# Patient Record
Sex: FEMALE | Race: WHITE | NOT HISPANIC OR LATINO | Employment: UNEMPLOYED | URBAN - METROPOLITAN AREA
[De-identification: names, ages, dates, MRNs, and addresses within clinical notes are randomized per-mention and may not be internally consistent; named-entity substitution may affect disease eponyms.]

---

## 2017-01-04 ENCOUNTER — HOSPITAL ENCOUNTER (OUTPATIENT)
Dept: MAMMOGRAPHY | Facility: HOSPITAL | Age: 57
Discharge: HOME | End: 2017-01-04
Attending: OBSTETRICS & GYNECOLOGY | Admitting: OBSTETRICS & GYNECOLOGY

## 2017-01-04 DIAGNOSIS — Z13.9 SCREENING: ICD-10-CM

## 2017-01-04 PROCEDURE — 77063 BREAST TOMOSYNTHESIS BI: CPT

## 2017-01-04 PROCEDURE — G0202 SCR MAMMO BI INCL CAD: HCPCS

## 2018-01-19 ENCOUNTER — TRANSCRIBE ORDERS (OUTPATIENT)
Dept: ADMINISTRATIVE | Facility: HOSPITAL | Age: 58
End: 2018-01-19

## 2018-01-19 DIAGNOSIS — Z12.31 VISIT FOR SCREENING MAMMOGRAM: Primary | ICD-10-CM

## 2018-01-23 ENCOUNTER — HOSPITAL ENCOUNTER (OUTPATIENT)
Dept: MAMMOGRAPHY | Facility: HOSPITAL | Age: 58
Discharge: HOME | End: 2018-01-23
Attending: OBSTETRICS & GYNECOLOGY | Admitting: OBSTETRICS & GYNECOLOGY

## 2018-01-23 DIAGNOSIS — Z12.31 VISIT FOR SCREENING MAMMOGRAM: ICD-10-CM

## 2018-01-23 PROCEDURE — 77067 SCR MAMMO BI INCL CAD: CPT

## 2018-01-23 PROCEDURE — 77063 BREAST TOMOSYNTHESIS BI: CPT

## 2018-01-25 ENCOUNTER — OFFICE VISIT (OUTPATIENT)
Dept: OBSTETRICS AND GYNECOLOGY | Facility: CLINIC | Age: 58
End: 2018-01-25

## 2018-01-25 VITALS
HEIGHT: 63 IN | DIASTOLIC BLOOD PRESSURE: 83 MMHG | WEIGHT: 158.9 LBS | BODY MASS INDEX: 28.16 KG/M2 | SYSTOLIC BLOOD PRESSURE: 124 MMHG

## 2018-01-25 DIAGNOSIS — Z13.9 SCREENING FOR CONDITION: Primary | ICD-10-CM

## 2018-01-25 DIAGNOSIS — Z01.419 ENCOUNTER FOR GYNECOLOGICAL EXAMINATION WITHOUT ABNORMAL FINDING: ICD-10-CM

## 2018-01-25 DIAGNOSIS — N95.1 HOT FLASHES DUE TO MENOPAUSE: ICD-10-CM

## 2018-01-25 PROBLEM — E03.9 HYPOTHYROIDISM: Status: ACTIVE | Noted: 2018-01-25

## 2018-01-25 LAB
BILIRUB BLD-MCNC: NEGATIVE MG/DL
CLARITY, POC: CLEAR
COLOR UR: YELLOW
GLUCOSE UR STRIP-MCNC: NEGATIVE MG/DL
KETONES UR QL: NEGATIVE
LEUKOCYTE EST, POC: NEGATIVE
NITRITE UR-MCNC: NEGATIVE MG/ML
PH UR: 5 [PH] (ref 5–8)
PROT UR STRIP-MCNC: NEGATIVE MG/DL
RBC # UR STRIP: NEGATIVE /UL
SP GR UR: 1 (ref 1–1.03)
UROBILINOGEN UR QL: NORMAL

## 2018-01-25 PROCEDURE — 99396 PREV VISIT EST AGE 40-64: CPT | Performed by: OBSTETRICS & GYNECOLOGY

## 2018-01-25 PROCEDURE — 81002 URINALYSIS NONAUTO W/O SCOPE: CPT | Performed by: OBSTETRICS & GYNECOLOGY

## 2018-01-25 RX ORDER — ATORVASTATIN CALCIUM 20 MG/1
TABLET, FILM COATED ORAL
Refills: 0 | COMMUNITY
Start: 2018-01-02 | End: 2018-04-04

## 2018-01-25 RX ORDER — MELOXICAM 7.5 MG/1
TABLET ORAL
Refills: 0 | COMMUNITY
Start: 2018-01-13 | End: 2018-04-04

## 2018-01-25 RX ORDER — LEVOTHYROXINE SODIUM 0.03 MG/1
25 TABLET ORAL
COMMUNITY
End: 2018-04-04

## 2018-01-25 RX ORDER — ERGOCALCIFEROL 1.25 MG/1
50000 CAPSULE ORAL
Refills: 0 | COMMUNITY
Start: 2017-12-27

## 2018-01-25 RX ORDER — PREDNISONE 5 MG/1
TABLET ORAL
Refills: 0 | COMMUNITY
Start: 2018-01-22 | End: 2018-04-04

## 2018-01-25 NOTE — PROGRESS NOTES
GYN Annual Exam     CC- Here for annual exam.     Cate Rosario is a 57 y.o. female established patient who presents for annual well woman exam. No  VB. She thought Abbey was working but stopped it b/c she did not want to take any meds. Her HF are still there but are mild and do not require treatment. No  VB.       OB History      Para Term  AB Living    1 1 1   1    SAB TAB Ectopic Multiple Live Births                Obstetric Comments    1 CS          Menarche:15  Menopause:49, ablation  HRT: took HRT for 2 years, none now  Current contraception: tubal ligation  History of abnormal Pap smear: no  History of abnormal mammogram: no  Family history of uterine, colon or ovarian cancer: yes - GM  Family history of breast cancer: no  STD's: none    Health Maintenance   Topic Date Due   • TDAP/TD VACCINES (1 - Tdap) 10/31/1981   • INFLUENZA VACCINE  2017   • HEPATITIS C SCREENING  2018   • PAP SMEAR  2018   • LIPID PANEL  2018   • MAMMOGRAM  2020   • COLONOSCOPY  2026       Past Medical History:   Diagnosis Date   • Heart murmur    • Heart murmur    • Hyperlipidemia    • Hypothyroidism 2018   • Sciatic nerve pain    • Thyroid disease        Past Surgical History:   Procedure Laterality Date   •  SECTION     • COLONOSCOPY N/A 2016    Procedure: COLONOSCOPY;  Surgeon: Juliane Chu MD;  Location: [facility];  Service:    • DILATATION AND CURETTAGE     • ENDOMETRIAL ABLATION     • LASER ABLATION     • TUBAL ABDOMINAL LIGATION           Current Outpatient Prescriptions:   •  aspirin 81 MG tablet, Take 81 mg by mouth daily., Disp: , Rfl:   •  atorvastatin (LIPITOR) 20 MG tablet, Take 1 tablet by mouth daily, Disp: , Rfl: 0  •  Bioflavonoid Products (MICHAEL C PO), Take  by mouth., Disp: , Rfl:   •  cyclobenzaprine (FLEXERIL) 10 MG tablet, Take 1 tablet (10 mg total) by mouth 3 (three) times a day as needed for muscle spasms, Disp: 20 tablet,  "Rfl: 0  •  HYDROcodone-acetaminophen (NORCO) 5-325 MG per tablet, Take 1 tablet by mouth every 4 (four) hours as needed for moderate pain (4-6), Disp: 20 tablet, Rfl: 0  •  HYDROCODONE-ACETAMINOPHEN PO, Take  by mouth., Disp: , Rfl:   •  levothyroxine (SYNTHROID, LEVOTHROID) 25 MCG tablet, Take 25 mcg by mouth., Disp: , Rfl:   •  meloxicam (MOBIC) 7.5 MG tablet, take 1 tablet by mouth daily, Disp: , Rfl: 0  •  PARoxetine Mesylate 7.5 MG capsule, Take  by mouth., Disp: , Rfl:   •  PEG-KCl-NaCl-NaSulf-Na Asc-C (MOVIPREP) 100 G reconstituted solution, Take as directed, Disp: 1 each, Rfl: 0  •  PredniSONE 5 MG (21) tablet therapy pack dosepak, take as directed by mouth for 6 days, Disp: , Rfl: 0  •  vitamin D (ERGOCALCIFEROL) 02490 units capsule capsule, , Disp: , Rfl: 0    No Known Allergies    Social History   Substance Use Topics   • Smoking status: Never Smoker   • Smokeless tobacco: None   • Alcohol use Yes      Comment: occ/social       Family History   Problem Relation Age of Onset   • Thyroid disease Mother    • Heart disease Father    • Colon cancer Maternal Grandmother    • Breast cancer Neg Hx    • Ovarian cancer Neg Hx        Review of Systems   Constitutional: Negative for appetite change, fatigue, fever and unexpected weight change.   Respiratory: Negative for cough and shortness of breath.    Cardiovascular: Negative for chest pain and palpitations.   Gastrointestinal: Negative for abdominal distention, abdominal pain, constipation, diarrhea and nausea.   Endocrine: Positive for heat intolerance (mild).   Genitourinary: Negative for dyspareunia, dysuria, menstrual problem, pelvic pain and vaginal discharge.   Musculoskeletal: Positive for back pain (sciatica).   Skin: Negative for color change and rash.   Neurological: Negative for headaches.   Psychiatric/Behavioral: Negative for dysphoric mood. The patient is not nervous/anxious.        /83  Ht 160 cm (63\")  Wt 72.1 kg (158 lb 14.4 oz)  LMP  " (LMP Unknown)  Breastfeeding? No  BMI 28.15 kg/m2    Physical Exam   Constitutional: She is oriented to person, place, and time. She appears well-developed and well-nourished.   HENT:   Head: Normocephalic and atraumatic.   Neck: No thyromegaly present.   Cardiovascular: Normal rate and regular rhythm.    Pulmonary/Chest: Effort normal and breath sounds normal. Right breast exhibits no inverted nipple, no mass, no nipple discharge, no skin change and no tenderness. Left breast exhibits no inverted nipple, no mass, no nipple discharge, no skin change and no tenderness.   Abdominal: Soft. Bowel sounds are normal. She exhibits no distension and no mass. There is no tenderness. No hernia.   Genitourinary: Uterus normal. Pelvic exam was performed with patient supine. There is no rash, tenderness or lesion on the right labia. There is no rash, tenderness or lesion on the left labia. Cervix exhibits no motion tenderness, no discharge and no friability. Right adnexum displays no mass, no tenderness and no fullness. Left adnexum displays no mass, no tenderness and no fullness. No erythema, tenderness or bleeding in the vagina. No foreign body in the vagina. No signs of injury around the vagina. No vaginal discharge found.   Neurological: She is oriented to person, place, and time.   Skin: Skin is warm and dry.   Psychiatric: She has a normal mood and affect. Her behavior is normal. Judgment and thought content normal.   Vitals reviewed.         Assessment/Plan    1) GYN HM: normal pap/HPV 1/2017    SBE demonstrated and encouraged.  2) STD screening: declines Condoms encouraged.  3) Bone health - Weight bearing exercise, dietary calcium recommendations and vitamin D reviewed.   4) Diet and Exercise discussed  5) Smoking Status: non smoker  6) Social: no issues  7)MMG:  UTD 1/2018 Birads 1  8) DEXA-needs, schedule  9)C scope- UTD fall 2016, repeat 5 years   Follow up prn and 1 year       Cate was seen today for gynecologic  exam.    Diagnoses and all orders for this visit:    Screening for condition  -     POC Urinalysis Dipstick  -     DEXA Bone Density Axial; Future    Encounter for gynecological examination without abnormal finding    Hot flashes due to menopause        Bety Irizarry MD  1/25/2018  10:09 AM

## 2018-02-08 ENCOUNTER — APPOINTMENT (OUTPATIENT)
Dept: BONE DENSITY | Facility: HOSPITAL | Age: 58
End: 2018-02-08
Attending: OBSTETRICS & GYNECOLOGY

## 2018-03-13 ENCOUNTER — OFFICE VISIT (OUTPATIENT)
Dept: ORTHOPEDIC SURGERY | Facility: CLINIC | Age: 58
End: 2018-03-13

## 2018-03-13 VITALS — WEIGHT: 158 LBS | BODY MASS INDEX: 26.98 KG/M2 | HEIGHT: 64 IN | TEMPERATURE: 98.4 F

## 2018-03-13 DIAGNOSIS — M51.26 HERNIATED LUMBAR INTERVERTEBRAL DISC: Primary | ICD-10-CM

## 2018-03-13 PROCEDURE — 99204 OFFICE O/P NEW MOD 45 MIN: CPT | Performed by: ORTHOPAEDIC SURGERY

## 2018-03-13 NOTE — PROGRESS NOTES
New patient or new problem visit    Chief Complaint   Patient presents with   • Lumbar Spine - Pain       HPI: She complains of left lower extremity pain more so than back pain ongoing since the first week of January of this year epidural injections ×2 of helped immensely but still with a lot of pain she's very uncomfortable and prefers to stand.  The pain is mild at present stabbing worse with activity.  PT did not help.    PFSH: See chart- reviewed    Review of Systems   Constitutional: Positive for diaphoresis. Negative for chills (night sweats), fever and unexpected weight change.   HENT: Negative for trouble swallowing and voice change.    Eyes: Negative for visual disturbance.   Respiratory: Negative for cough and shortness of breath.    Cardiovascular: Negative for chest pain and leg swelling.   Gastrointestinal: Negative for abdominal pain, nausea and vomiting.   Endocrine: Negative for cold intolerance and heat intolerance.   Genitourinary: Negative for difficulty urinating, frequency and urgency.   Skin: Negative for rash and wound.   Allergic/Immunologic: Negative for immunocompromised state.   Neurological: Negative for weakness and numbness.   Hematological: Does not bruise/bleed easily.   Psychiatric/Behavioral: Negative for dysphoric mood. The patient is not nervous/anxious.        PE: Constitutional: Vital signs above-noted.  Awake, alert and oriented    Psychiatric: Affect and insight do not appear grossly disturbed.    Pulmonary: Breathing is unlabored, color is good.    Skin: Warm, dry and normal turgor    Cardiac:  pedal pulses intact.  No edema.    Eyesight and hearing appear adequate for examination purposes      Musculoskeletal:  There is minimal tenderness to percussion and palpation of the spine. Motion appears undisturbed.  Posture is unremarkable to coronal and sagittal inspection.    The skin about the area is intact.  There is no palpable or visible deformity.  There is no local spasm.        Neurologic:   Reflexes are 2+ and symmetrical in the patellae and achilles.   Motor function is undisturbed in quadriceps, EHL, and gastrocnemius      Sensation appears symmetrically intact to light touch   .  In the bilateral lower extremities there is problems.  If we need to then I will be fine but not without exhausting conservative care I'll see her back in 3 weeks after she's had the last epidural injection.   Clonus is absent..  Gait appears undisturbed.  SLR test negative      MEDICAL DECISION MAKING    XRAY: Plain film x-rays of the lumbar spine show L5-S1 disc degeneration and some loss of lordosis.  MRI scan from [facility] open MRI shows a left-sided L2-3 disc extrusion and a left-sided L5 L5-S1 disc protrusion.  I agree with radiologist report    Other: n/a    Impression: Left L2-3, left L5-S1 herniated disc    Plan: 6

## 2018-03-26 ENCOUNTER — TELEPHONE (OUTPATIENT)
Dept: ORTHOPEDIC SURGERY | Facility: CLINIC | Age: 58
End: 2018-03-26

## 2018-03-26 NOTE — TELEPHONE ENCOUNTER
Her 3rd LESI is on Thursday, but she would like to go ahead and set up surgery. Should she still get the LESI or just cancel it? Please advise. I will need a case request please.         RA

## 2018-03-27 ENCOUNTER — PREP FOR SURGERY (OUTPATIENT)
Dept: OTHER | Facility: HOSPITAL | Age: 58
End: 2018-03-27

## 2018-03-27 DIAGNOSIS — M51.26 LUMBAR HERNIATED DISC: Primary | ICD-10-CM

## 2018-03-27 RX ORDER — SODIUM CHLORIDE, SODIUM LACTATE, POTASSIUM CHLORIDE, CALCIUM CHLORIDE 600; 310; 30; 20 MG/100ML; MG/100ML; MG/100ML; MG/100ML
100 INJECTION, SOLUTION INTRAVENOUS CONTINUOUS
Status: CANCELLED | OUTPATIENT
Start: 2018-04-09

## 2018-03-27 RX ORDER — CEFAZOLIN SODIUM 2 G/100ML
2 INJECTION, SOLUTION INTRAVENOUS ONCE
Status: CANCELLED | OUTPATIENT
Start: 2018-04-09 | End: 2018-04-09

## 2018-03-27 RX ORDER — SODIUM CHLORIDE 0.9 % (FLUSH) 0.9 %
1-10 SYRINGE (ML) INJECTION AS NEEDED
Status: CANCELLED | OUTPATIENT
Start: 2018-04-09

## 2018-03-29 PROBLEM — M51.26 LUMBAR HERNIATED DISC: Status: ACTIVE | Noted: 2018-03-29

## 2018-04-04 ENCOUNTER — APPOINTMENT (OUTPATIENT)
Dept: PREADMISSION TESTING | Facility: HOSPITAL | Age: 58
End: 2018-04-04

## 2018-04-04 VITALS
HEIGHT: 63 IN | RESPIRATION RATE: 20 BRPM | WEIGHT: 158 LBS | DIASTOLIC BLOOD PRESSURE: 85 MMHG | BODY MASS INDEX: 28 KG/M2 | SYSTOLIC BLOOD PRESSURE: 133 MMHG | TEMPERATURE: 98.3 F | HEART RATE: 77 BPM | OXYGEN SATURATION: 98 %

## 2018-04-04 LAB
ANION GAP SERPL CALCULATED.3IONS-SCNC: 15.2 MMOL/L
BUN BLD-MCNC: 10 MG/DL (ref 6–20)
BUN/CREAT SERPL: 14.3 (ref 7–25)
CALCIUM SPEC-SCNC: 10.2 MG/DL (ref 8.6–10.5)
CHLORIDE SERPL-SCNC: 103 MMOL/L (ref 98–107)
CO2 SERPL-SCNC: 26.8 MMOL/L (ref 22–29)
CREAT BLD-MCNC: 0.7 MG/DL (ref 0.57–1)
DEPRECATED RDW RBC AUTO: 47.2 FL (ref 37–54)
ERYTHROCYTE [DISTWIDTH] IN BLOOD BY AUTOMATED COUNT: 13.9 % (ref 11.7–13)
GFR SERPL CREATININE-BSD FRML MDRD: 87 ML/MIN/1.73
GLUCOSE BLD-MCNC: 95 MG/DL (ref 65–99)
HCT VFR BLD AUTO: 43.2 % (ref 35.6–45.5)
HGB BLD-MCNC: 13.4 G/DL (ref 11.9–15.5)
MCH RBC QN AUTO: 28.5 PG (ref 26.9–32)
MCHC RBC AUTO-ENTMCNC: 31 G/DL (ref 32.4–36.3)
MCV RBC AUTO: 91.9 FL (ref 80.5–98.2)
PLATELET # BLD AUTO: 178 10*3/MM3 (ref 140–500)
PMV BLD AUTO: 10.4 FL (ref 6–12)
POTASSIUM BLD-SCNC: 4.4 MMOL/L (ref 3.5–5.2)
RBC # BLD AUTO: 4.7 10*6/MM3 (ref 3.9–5.2)
SODIUM BLD-SCNC: 145 MMOL/L (ref 136–145)
WBC NRBC COR # BLD: 4.98 10*3/MM3 (ref 4.5–10.7)

## 2018-04-04 PROCEDURE — 36415 COLL VENOUS BLD VENIPUNCTURE: CPT

## 2018-04-04 PROCEDURE — 85027 COMPLETE CBC AUTOMATED: CPT | Performed by: ORTHOPAEDIC SURGERY

## 2018-04-04 PROCEDURE — 80048 BASIC METABOLIC PNL TOTAL CA: CPT | Performed by: ORTHOPAEDIC SURGERY

## 2018-04-04 RX ORDER — ATORVASTATIN CALCIUM 20 MG/1
20 TABLET, FILM COATED ORAL EVERY MORNING
COMMUNITY

## 2018-04-04 RX ORDER — LEVOTHYROXINE SODIUM 0.05 MG/1
50 TABLET ORAL EVERY MORNING
COMMUNITY
End: 2020-01-06

## 2018-04-04 NOTE — DISCHARGE INSTRUCTIONS
Take the following medications the morning of surgery with a small sip of water:none      General Instructions:  • Do not eat solid food after midnight the night before surgery.  • You may drink clear liquids day of surgery but must stop at least one hour before your hospital arrival time.  • It is beneficial for you to have a clear drink that contains carbohydrates the day of surgery.  We suggest a 12 to 20 ounce bottle of Gatorade or Powerade for non-diabetic patients or a 12 to 20 ounce bottle of G2 or Powerade Zero for diabetic patients. (Pediatric patients, are not advised to drink a 12 to 20 ounce carbohydrate drink)    Clear liquids are liquids you can see through.  Nothing red in color.     Plain water                               Sports drinks  Sodas                                   Gelatin (Jell-O)  Fruit juices without pulp such as white grape juice and apple juice  Popsicles that contain no fruit or yogurt  Tea or coffee (no cream or milk added)  Gatorade / Powerade  G2 / Powerade Zero    • Infants may have breast milk up to four hours before surgery.  • Infants drinking formula may drink formula up to six hours before surgery.   • Patients who avoid smoking, chewing tobacco and alcohol for 4 weeks prior to surgery have a reduced risk of post-operative complications.  Quit smoking as many days before surgery as you can.  • Do not smoke, use chewing tobacco or drink alcohol the day of surgery.   • If applicable bring your C-PAP/ BI-PAP machine.  • Bring any papers given to you in the doctor’s office.  • Wear clean comfortable clothes and socks.  • Do not wear contact lenses or make-up.  Bring a case for your glasses.   • Bring crutches or walker if applicable.  • Remove all piercings.  Leave jewelry and any other valuables at home.  • Hair extensions with metal clips must be removed prior to surgery.  • The Pre-Admission Testing nurse will instruct you to bring medications if unable to obtain an  accurate list in Pre-Admission Testing.        If you were given a blood bank ID arm band remember to bring it with you the day of surgery.    Preventing a Surgical Site Infection:  • For 2 to 3 days before surgery, avoid shaving with a razor because the razor can irritate skin and make it easier to develop an infection.  • The night prior to surgery sleep in a clean bed with clean clothing.  Do not allow pets to sleep with you.  • Shower on the morning of surgery using a fresh bar of anti-bacterial soap (such as Dial) and clean washcloth.  Dry with a clean towel and dress in clean clothing.  • Ask your surgeon if you will be receiving antibiotics prior to surgery.  • Make sure you, your family, and all healthcare providers clean their hands with soap and water or an alcohol based hand  before caring for you or your wound.    Day of surgery:  Upon arrival, a Pre-op nurse and Anesthesiologist will review your health history, obtain vital signs, and answer questions you may have.  The only belongings needed at this time will be your home medications and if applicable your C-PAP/BI-PAP machine.  If you are staying overnight your family can leave the rest of your belongings in the car and bring them to your room later.  A Pre-op nurse will start an IV and you may receive medication in preparation for surgery, including something to help you relax.  Your family will be able to see you in the Pre-op area.  While you are in surgery your family should notify the waiting room  if they leave the waiting room area and provide a contact phone number.    Please be aware that surgery does come with discomfort.  We want to make every effort to control your discomfort so please discuss any uncontrolled symptoms with your nurse.   Your doctor will most likely have prescribed pain medications.      If you are going home after surgery you will receive individualized written care instructions before being  discharged.  A responsible adult must drive you to and from the hospital on the day of your surgery and stay with you for 24 hours.    If you are staying overnight following surgery, you will be transported to your hospital room following the recovery period.  [facility] has all private rooms.    If you have any questions please call Pre-Admission Testing at [phone].  Deductibles and co-payments are collected on the day of service. Please be prepared to pay the required co-pay, deductible or deposit on the day of service as defined by your plan.

## 2018-04-09 ENCOUNTER — ANESTHESIA (OUTPATIENT)
Dept: PERIOP | Facility: HOSPITAL | Age: 58
End: 2018-04-09

## 2018-04-09 ENCOUNTER — APPOINTMENT (OUTPATIENT)
Dept: GENERAL RADIOLOGY | Facility: HOSPITAL | Age: 58
End: 2018-04-09

## 2018-04-09 ENCOUNTER — ANESTHESIA EVENT (OUTPATIENT)
Dept: PERIOP | Facility: HOSPITAL | Age: 58
End: 2018-04-09

## 2018-04-09 ENCOUNTER — HOSPITAL ENCOUNTER (OUTPATIENT)
Facility: HOSPITAL | Age: 58
Discharge: HOME | End: 2018-04-10
Attending: ORTHOPAEDIC SURGERY | Admitting: ORTHOPAEDIC SURGERY

## 2018-04-09 DIAGNOSIS — M51.26 LUMBAR HERNIATED DISC: ICD-10-CM

## 2018-04-09 LAB
ABO GROUP BLD: NORMAL
BLD GP AB SCN SERPL QL: NEGATIVE
RH BLD: NEGATIVE
T&S EXPIRATION DATE: NORMAL

## 2018-04-09 PROCEDURE — 25010000002 ONDANSETRON PER 1 MG: Performed by: NURSE ANESTHETIST, CERTIFIED REGISTERED

## 2018-04-09 PROCEDURE — 25010000002 FENTANYL CITRATE (PF) 100 MCG/2ML SOLUTION: Performed by: ANESTHESIOLOGY

## 2018-04-09 PROCEDURE — 25010000002 FENTANYL CITRATE (PF) 100 MCG/2ML SOLUTION: Performed by: NURSE ANESTHETIST, CERTIFIED REGISTERED

## 2018-04-09 PROCEDURE — 63035 LAMOT DCMPRN NRV RT EA ADDL: CPT | Performed by: ORTHOPAEDIC SURGERY

## 2018-04-09 PROCEDURE — 86901 BLOOD TYPING SEROLOGIC RH(D): CPT | Performed by: ORTHOPAEDIC SURGERY

## 2018-04-09 PROCEDURE — 86900 BLOOD TYPING SEROLOGIC ABO: CPT | Performed by: ORTHOPAEDIC SURGERY

## 2018-04-09 PROCEDURE — 25010000002 HYDROMORPHONE HCL PF 500 MG/50ML SOLUTION: Performed by: ORTHOPAEDIC SURGERY

## 2018-04-09 PROCEDURE — G0378 HOSPITAL OBSERVATION PER HR: HCPCS

## 2018-04-09 PROCEDURE — 25010000002 DEXAMETHASONE PER 1 MG: Performed by: NURSE ANESTHETIST, CERTIFIED REGISTERED

## 2018-04-09 PROCEDURE — 25010000002 HYDROMORPHONE PER 4 MG: Performed by: NURSE ANESTHETIST, CERTIFIED REGISTERED

## 2018-04-09 PROCEDURE — 63030 LAMOT DCMPRN NRV RT 1 LMBR: CPT | Performed by: ORTHOPAEDIC SURGERY

## 2018-04-09 PROCEDURE — 25810000003 POTASSIUM CHLORIDE PER 2 MEQ: Performed by: ORTHOPAEDIC SURGERY

## 2018-04-09 PROCEDURE — 25010000003 CEFAZOLIN IN DEXTROSE 2-4 GM/100ML-% SOLUTION: Performed by: ORTHOPAEDIC SURGERY

## 2018-04-09 PROCEDURE — 86850 RBC ANTIBODY SCREEN: CPT | Performed by: ORTHOPAEDIC SURGERY

## 2018-04-09 PROCEDURE — 76000 FLUOROSCOPY <1 HR PHYS/QHP: CPT

## 2018-04-09 PROCEDURE — 25010000002 PROPOFOL 10 MG/ML EMULSION: Performed by: NURSE ANESTHETIST, CERTIFIED REGISTERED

## 2018-04-09 PROCEDURE — 72100 X-RAY EXAM L-S SPINE 2/3 VWS: CPT

## 2018-04-09 PROCEDURE — 25010000002 MIDAZOLAM PER 1 MG: Performed by: ANESTHESIOLOGY

## 2018-04-09 RX ORDER — FENTANYL CITRATE 50 UG/ML
INJECTION, SOLUTION INTRAMUSCULAR; INTRAVENOUS AS NEEDED
Status: DISCONTINUED | OUTPATIENT
Start: 2018-04-09 | End: 2018-04-09 | Stop reason: SURG

## 2018-04-09 RX ORDER — ROCURONIUM BROMIDE 10 MG/ML
INJECTION, SOLUTION INTRAVENOUS AS NEEDED
Status: DISCONTINUED | OUTPATIENT
Start: 2018-04-09 | End: 2018-04-09 | Stop reason: SURG

## 2018-04-09 RX ORDER — ONDANSETRON 4 MG/1
4 TABLET, FILM COATED ORAL EVERY 6 HOURS PRN
Status: DISCONTINUED | OUTPATIENT
Start: 2018-04-09 | End: 2018-04-10 | Stop reason: HOSPADM

## 2018-04-09 RX ORDER — ONDANSETRON 2 MG/ML
4 INJECTION INTRAMUSCULAR; INTRAVENOUS ONCE AS NEEDED
Status: DISCONTINUED | OUTPATIENT
Start: 2018-04-09 | End: 2018-04-09 | Stop reason: HOSPADM

## 2018-04-09 RX ORDER — SODIUM CHLORIDE, SODIUM LACTATE, POTASSIUM CHLORIDE, CALCIUM CHLORIDE 600; 310; 30; 20 MG/100ML; MG/100ML; MG/100ML; MG/100ML
100 INJECTION, SOLUTION INTRAVENOUS CONTINUOUS
Status: DISCONTINUED | OUTPATIENT
Start: 2018-04-09 | End: 2018-04-10 | Stop reason: HOSPADM

## 2018-04-09 RX ORDER — NALOXONE HCL 0.4 MG/ML
0.2 VIAL (ML) INJECTION AS NEEDED
Status: DISCONTINUED | OUTPATIENT
Start: 2018-04-09 | End: 2018-04-09 | Stop reason: HOSPADM

## 2018-04-09 RX ORDER — BISACODYL 10 MG
10 SUPPOSITORY, RECTAL RECTAL DAILY PRN
Status: DISCONTINUED | OUTPATIENT
Start: 2018-04-09 | End: 2018-04-10 | Stop reason: HOSPADM

## 2018-04-09 RX ORDER — FLUMAZENIL 0.1 MG/ML
0.2 INJECTION INTRAVENOUS AS NEEDED
Status: DISCONTINUED | OUTPATIENT
Start: 2018-04-09 | End: 2018-04-09 | Stop reason: HOSPADM

## 2018-04-09 RX ORDER — FAMOTIDINE 10 MG/ML
20 INJECTION, SOLUTION INTRAVENOUS ONCE
Status: COMPLETED | OUTPATIENT
Start: 2018-04-09 | End: 2018-04-09

## 2018-04-09 RX ORDER — LIDOCAINE HYDROCHLORIDE 10 MG/ML
0.5 INJECTION, SOLUTION EPIDURAL; INFILTRATION; INTRACAUDAL; PERINEURAL ONCE AS NEEDED
Status: DISCONTINUED | OUTPATIENT
Start: 2018-04-09 | End: 2018-04-09 | Stop reason: HOSPADM

## 2018-04-09 RX ORDER — FENTANYL CITRATE 50 UG/ML
50 INJECTION, SOLUTION INTRAMUSCULAR; INTRAVENOUS
Status: DISCONTINUED | OUTPATIENT
Start: 2018-04-09 | End: 2018-04-09 | Stop reason: HOSPADM

## 2018-04-09 RX ORDER — LABETALOL HYDROCHLORIDE 5 MG/ML
5 INJECTION, SOLUTION INTRAVENOUS
Status: DISCONTINUED | OUTPATIENT
Start: 2018-04-09 | End: 2018-04-09 | Stop reason: HOSPADM

## 2018-04-09 RX ORDER — CEFAZOLIN SODIUM 2 G/100ML
2 INJECTION, SOLUTION INTRAVENOUS EVERY 8 HOURS
Status: COMPLETED | OUTPATIENT
Start: 2018-04-09 | End: 2018-04-10

## 2018-04-09 RX ORDER — ATORVASTATIN CALCIUM 20 MG/1
20 TABLET, FILM COATED ORAL EVERY MORNING
Status: DISCONTINUED | OUTPATIENT
Start: 2018-04-10 | End: 2018-04-10 | Stop reason: HOSPADM

## 2018-04-09 RX ORDER — SODIUM CHLORIDE AND POTASSIUM CHLORIDE 150; 450 MG/100ML; MG/100ML
100 INJECTION, SOLUTION INTRAVENOUS CONTINUOUS
Status: DISCONTINUED | OUTPATIENT
Start: 2018-04-09 | End: 2018-04-10 | Stop reason: HOSPADM

## 2018-04-09 RX ORDER — PROMETHAZINE HYDROCHLORIDE 25 MG/1
12.5 TABLET ORAL ONCE AS NEEDED
Status: DISCONTINUED | OUTPATIENT
Start: 2018-04-09 | End: 2018-04-09 | Stop reason: HOSPADM

## 2018-04-09 RX ORDER — PROPOFOL 10 MG/ML
VIAL (ML) INTRAVENOUS AS NEEDED
Status: DISCONTINUED | OUTPATIENT
Start: 2018-04-09 | End: 2018-04-09 | Stop reason: SURG

## 2018-04-09 RX ORDER — ONDANSETRON 4 MG/1
4 TABLET, ORALLY DISINTEGRATING ORAL EVERY 6 HOURS PRN
Status: DISCONTINUED | OUTPATIENT
Start: 2018-04-09 | End: 2018-04-10 | Stop reason: HOSPADM

## 2018-04-09 RX ORDER — HYDROMORPHONE HCL IN 0.9% NACL 10 MG/50ML
PATIENT CONTROLLED ANALGESIA SYRINGE INTRAVENOUS CONTINUOUS
Status: DISCONTINUED | OUTPATIENT
Start: 2018-04-09 | End: 2018-04-10 | Stop reason: HOSPADM

## 2018-04-09 RX ORDER — OXYCODONE HYDROCHLORIDE AND ACETAMINOPHEN 5; 325 MG/1; MG/1
2 TABLET ORAL EVERY 4 HOURS PRN
Status: DISCONTINUED | OUTPATIENT
Start: 2018-04-09 | End: 2018-04-10 | Stop reason: HOSPADM

## 2018-04-09 RX ORDER — ONDANSETRON 2 MG/ML
4 INJECTION INTRAMUSCULAR; INTRAVENOUS EVERY 6 HOURS PRN
Status: DISCONTINUED | OUTPATIENT
Start: 2018-04-09 | End: 2018-04-10 | Stop reason: HOSPADM

## 2018-04-09 RX ORDER — MEPERIDINE HYDROCHLORIDE 25 MG/ML
12.5 INJECTION INTRAMUSCULAR; INTRAVENOUS; SUBCUTANEOUS
Status: DISCONTINUED | OUTPATIENT
Start: 2018-04-09 | End: 2018-04-09 | Stop reason: HOSPADM

## 2018-04-09 RX ORDER — SODIUM CHLORIDE 0.9 % (FLUSH) 0.9 %
1-10 SYRINGE (ML) INJECTION AS NEEDED
Status: DISCONTINUED | OUTPATIENT
Start: 2018-04-09 | End: 2018-04-09 | Stop reason: HOSPADM

## 2018-04-09 RX ORDER — NALOXONE HCL 0.4 MG/ML
0.1 VIAL (ML) INJECTION
Status: DISCONTINUED | OUTPATIENT
Start: 2018-04-09 | End: 2018-04-10 | Stop reason: HOSPADM

## 2018-04-09 RX ORDER — HYDRALAZINE HYDROCHLORIDE 20 MG/ML
5 INJECTION INTRAMUSCULAR; INTRAVENOUS
Status: DISCONTINUED | OUTPATIENT
Start: 2018-04-09 | End: 2018-04-09 | Stop reason: HOSPADM

## 2018-04-09 RX ORDER — ACETAMINOPHEN 325 MG/1
650 TABLET ORAL EVERY 4 HOURS PRN
Status: DISCONTINUED | OUTPATIENT
Start: 2018-04-09 | End: 2018-04-10 | Stop reason: HOSPADM

## 2018-04-09 RX ORDER — LEVOTHYROXINE SODIUM 0.05 MG/1
50 TABLET ORAL EVERY MORNING
Status: DISCONTINUED | OUTPATIENT
Start: 2018-04-10 | End: 2018-04-10 | Stop reason: HOSPADM

## 2018-04-09 RX ORDER — SODIUM CHLORIDE 0.9 % (FLUSH) 0.9 %
1-10 SYRINGE (ML) INJECTION AS NEEDED
Status: DISCONTINUED | OUTPATIENT
Start: 2018-04-09 | End: 2018-04-10 | Stop reason: HOSPADM

## 2018-04-09 RX ORDER — HYDROMORPHONE HCL 110MG/55ML
0.5 PATIENT CONTROLLED ANALGESIA SYRINGE INTRAVENOUS
Status: DISCONTINUED | OUTPATIENT
Start: 2018-04-09 | End: 2018-04-09 | Stop reason: HOSPADM

## 2018-04-09 RX ORDER — LIDOCAINE HYDROCHLORIDE 20 MG/ML
INJECTION, SOLUTION INFILTRATION; PERINEURAL AS NEEDED
Status: DISCONTINUED | OUTPATIENT
Start: 2018-04-09 | End: 2018-04-09 | Stop reason: SURG

## 2018-04-09 RX ORDER — MIDAZOLAM HYDROCHLORIDE 1 MG/ML
1 INJECTION INTRAMUSCULAR; INTRAVENOUS
Status: DISCONTINUED | OUTPATIENT
Start: 2018-04-09 | End: 2018-04-09 | Stop reason: HOSPADM

## 2018-04-09 RX ORDER — OXYCODONE AND ACETAMINOPHEN 7.5; 325 MG/1; MG/1
1 TABLET ORAL ONCE AS NEEDED
Status: DISCONTINUED | OUTPATIENT
Start: 2018-04-09 | End: 2018-04-09 | Stop reason: HOSPADM

## 2018-04-09 RX ORDER — PROMETHAZINE HYDROCHLORIDE 25 MG/ML
12.5 INJECTION, SOLUTION INTRAMUSCULAR; INTRAVENOUS ONCE AS NEEDED
Status: DISCONTINUED | OUTPATIENT
Start: 2018-04-09 | End: 2018-04-09 | Stop reason: HOSPADM

## 2018-04-09 RX ORDER — DIPHENHYDRAMINE HYDROCHLORIDE 50 MG/ML
12.5 INJECTION INTRAMUSCULAR; INTRAVENOUS
Status: DISCONTINUED | OUTPATIENT
Start: 2018-04-09 | End: 2018-04-09 | Stop reason: HOSPADM

## 2018-04-09 RX ORDER — DEXAMETHASONE SODIUM PHOSPHATE 4 MG/ML
INJECTION, SOLUTION INTRA-ARTICULAR; INTRALESIONAL; INTRAMUSCULAR; INTRAVENOUS; SOFT TISSUE AS NEEDED
Status: DISCONTINUED | OUTPATIENT
Start: 2018-04-09 | End: 2018-04-09 | Stop reason: SURG

## 2018-04-09 RX ORDER — CEFAZOLIN SODIUM 2 G/100ML
2 INJECTION, SOLUTION INTRAVENOUS ONCE
Status: COMPLETED | OUTPATIENT
Start: 2018-04-09 | End: 2018-04-09

## 2018-04-09 RX ORDER — PROMETHAZINE HYDROCHLORIDE 25 MG/1
25 SUPPOSITORY RECTAL ONCE AS NEEDED
Status: DISCONTINUED | OUTPATIENT
Start: 2018-04-09 | End: 2018-04-09 | Stop reason: HOSPADM

## 2018-04-09 RX ORDER — PROMETHAZINE HYDROCHLORIDE 25 MG/1
25 TABLET ORAL ONCE AS NEEDED
Status: DISCONTINUED | OUTPATIENT
Start: 2018-04-09 | End: 2018-04-09 | Stop reason: HOSPADM

## 2018-04-09 RX ORDER — MIDAZOLAM HYDROCHLORIDE 1 MG/ML
2 INJECTION INTRAMUSCULAR; INTRAVENOUS
Status: DISCONTINUED | OUTPATIENT
Start: 2018-04-09 | End: 2018-04-09 | Stop reason: HOSPADM

## 2018-04-09 RX ORDER — HYDROCODONE BITARTRATE AND ACETAMINOPHEN 7.5; 325 MG/1; MG/1
1 TABLET ORAL ONCE AS NEEDED
Status: DISCONTINUED | OUTPATIENT
Start: 2018-04-09 | End: 2018-04-09 | Stop reason: HOSPADM

## 2018-04-09 RX ORDER — ONDANSETRON 2 MG/ML
INJECTION INTRAMUSCULAR; INTRAVENOUS AS NEEDED
Status: DISCONTINUED | OUTPATIENT
Start: 2018-04-09 | End: 2018-04-09 | Stop reason: SURG

## 2018-04-09 RX ORDER — SENNA AND DOCUSATE SODIUM 50; 8.6 MG/1; MG/1
1 TABLET, FILM COATED ORAL NIGHTLY
Status: DISCONTINUED | OUTPATIENT
Start: 2018-04-09 | End: 2018-04-10 | Stop reason: HOSPADM

## 2018-04-09 RX ORDER — SODIUM CHLORIDE, SODIUM LACTATE, POTASSIUM CHLORIDE, CALCIUM CHLORIDE 600; 310; 30; 20 MG/100ML; MG/100ML; MG/100ML; MG/100ML
9 INJECTION, SOLUTION INTRAVENOUS CONTINUOUS
Status: DISCONTINUED | OUTPATIENT
Start: 2018-04-09 | End: 2018-04-10 | Stop reason: HOSPADM

## 2018-04-09 RX ORDER — EPHEDRINE SULFATE 50 MG/ML
5 INJECTION, SOLUTION INTRAVENOUS ONCE AS NEEDED
Status: DISCONTINUED | OUTPATIENT
Start: 2018-04-09 | End: 2018-04-09 | Stop reason: HOSPADM

## 2018-04-09 RX ADMIN — PROPOFOL 150 MG: 10 INJECTION, EMULSION INTRAVENOUS at 13:26

## 2018-04-09 RX ADMIN — FENTANYL CITRATE 50 MCG: 50 INJECTION, SOLUTION INTRAMUSCULAR; INTRAVENOUS at 08:32

## 2018-04-09 RX ADMIN — FENTANYL CITRATE 100 MCG: 50 INJECTION INTRAMUSCULAR; INTRAVENOUS at 13:22

## 2018-04-09 RX ADMIN — MIDAZOLAM 1 MG: 1 INJECTION INTRAMUSCULAR; INTRAVENOUS at 08:32

## 2018-04-09 RX ADMIN — MIDAZOLAM 1 MG: 1 INJECTION INTRAMUSCULAR; INTRAVENOUS at 11:39

## 2018-04-09 RX ADMIN — POTASSIUM CHLORIDE AND SODIUM CHLORIDE 100 ML/HR: 450; 150 INJECTION, SOLUTION INTRAVENOUS at 21:38

## 2018-04-09 RX ADMIN — DOCUSATE SODIUM -SENNOSIDES 1 TABLET: 50; 8.6 TABLET, COATED ORAL at 21:38

## 2018-04-09 RX ADMIN — CEFAZOLIN SODIUM 2 G: 2 INJECTION, SOLUTION INTRAVENOUS at 21:38

## 2018-04-09 RX ADMIN — FENTANYL CITRATE 50 MCG: 50 INJECTION, SOLUTION INTRAMUSCULAR; INTRAVENOUS at 10:12

## 2018-04-09 RX ADMIN — LIDOCAINE HYDROCHLORIDE 60 MG: 20 INJECTION, SOLUTION INFILTRATION; PERINEURAL at 13:26

## 2018-04-09 RX ADMIN — ROCURONIUM BROMIDE 40 MG: 10 INJECTION INTRAVENOUS at 13:26

## 2018-04-09 RX ADMIN — ROCURONIUM BROMIDE 10 MG: 10 INJECTION INTRAVENOUS at 14:45

## 2018-04-09 RX ADMIN — CEFAZOLIN SODIUM 2 G: 2 INJECTION, SOLUTION INTRAVENOUS at 13:30

## 2018-04-09 RX ADMIN — SODIUM CHLORIDE, POTASSIUM CHLORIDE, SODIUM LACTATE AND CALCIUM CHLORIDE 9 ML/HR: 600; 310; 30; 20 INJECTION, SOLUTION INTRAVENOUS at 10:12

## 2018-04-09 RX ADMIN — HYDROMORPHONE HYDROCHLORIDE 0.5 MG: 2 INJECTION, SOLUTION INTRAMUSCULAR; INTRAVENOUS; SUBCUTANEOUS at 16:32

## 2018-04-09 RX ADMIN — ONDANSETRON 4 MG: 2 INJECTION INTRAMUSCULAR; INTRAVENOUS at 15:06

## 2018-04-09 RX ADMIN — DEXAMETHASONE SODIUM PHOSPHATE 6 MG: 4 INJECTION INTRA-ARTICULAR; INTRALESIONAL; INTRAMUSCULAR; INTRAVENOUS; SOFT TISSUE at 14:30

## 2018-04-09 RX ADMIN — SUGAMMADEX 300 MG: 100 INJECTION, SOLUTION INTRAVENOUS at 15:15

## 2018-04-09 RX ADMIN — MIDAZOLAM 1 MG: 1 INJECTION INTRAMUSCULAR; INTRAVENOUS at 10:12

## 2018-04-09 RX ADMIN — FENTANYL CITRATE 50 MCG: 50 INJECTION INTRAMUSCULAR; INTRAVENOUS at 15:56

## 2018-04-09 RX ADMIN — SODIUM CHLORIDE, POTASSIUM CHLORIDE, SODIUM LACTATE AND CALCIUM CHLORIDE: 600; 310; 30; 20 INJECTION, SOLUTION INTRAVENOUS at 15:33

## 2018-04-09 RX ADMIN — FAMOTIDINE 20 MG: 10 INJECTION INTRAVENOUS at 08:32

## 2018-04-09 RX ADMIN — HYDROMORPHONE HYDROCHLORIDE 0.5 MG: 2 INJECTION, SOLUTION INTRAMUSCULAR; INTRAVENOUS; SUBCUTANEOUS at 16:49

## 2018-04-09 RX ADMIN — HYDROMORPHONE HYDROCHLORIDE: 10 INJECTION INTRAMUSCULAR; INTRAVENOUS; SUBCUTANEOUS at 16:04

## 2018-04-09 RX ADMIN — FENTANYL CITRATE 50 MCG: 50 INJECTION INTRAMUSCULAR; INTRAVENOUS at 16:25

## 2018-04-09 RX ADMIN — SODIUM CHLORIDE, POTASSIUM CHLORIDE, SODIUM LACTATE AND CALCIUM CHLORIDE 100 ML/HR: 600; 310; 30; 20 INJECTION, SOLUTION INTRAVENOUS at 08:32

## 2018-04-09 NOTE — OP NOTE
Operative note    Preoperative diagnosis:  Left L2-3, L5-S1 herniated discs    Postoperative diagnosis:  same    Procedure:  Left L2-3, L5-S1 discectomies    Surgeon:  Jr. MD Bren Padillat.:  Courtney Branch    Anesthetic: Gen.    EBL:  Minimal     Condition: Satisfactory    Description of procedure:  The patient was anesthetized and positioned prone the back was prepped and draped in sterile fashion.  Using C-arm identified the lumbosacral disc space.  A small laminotomy was performed on the left side at L5-S1.  The yellow ligament was excised and the shoulder of the S1 nerve root was retracted medially.  The disc protrusion was prominent and easily palpable in the thinned annulus.  This was incised and multiple fragments were removed with pituitary Ronjair is both straight and angled as well as irrigation of the disc space.  Finally I could easily retract the S1 root medially and no disc material remained loose at the herniation site.  I moved up to L2-3 and made a separate incision again using C-arm for guidance and performed an L2-3 discectomy and similar fashion on the left side.  Wounds were irrigated and closed with #1 Vicryl for the lumbodorsal fascia 2-0 Vicryl subcutaneously 4-0 Monocryl and Dermabond on the skin.  The patient tolerated procedure was about returned recovery room

## 2018-04-09 NOTE — PLAN OF CARE
Problem: Patient Care Overview  Goal: Plan of Care Review  Outcome: Ongoing (interventions implemented as appropriate)   04/09/18 1737   Plan of Care Review   Progress improving   OTHER   Outcome Summary Admit from PACU after lumbar surgery. Drsg clean, dry and intact. Ambulated to BR with assist x2, voided without difficulties.        Problem: Skin Injury Risk (Adult)  Goal: Identify Related Risk Factors and Signs and Symptoms  Outcome: Ongoing (interventions implemented as appropriate)    Goal: Skin Health and Integrity  Outcome: Ongoing (interventions implemented as appropriate)

## 2018-04-09 NOTE — ANESTHESIA PROCEDURE NOTES
Airway  Urgency: elective    Date/Time: 4/9/2018 1:28 PM  Airway not difficult    General Information and Staff    Patient location during procedure: OR  Anesthesiologist: MARGO OSBORNE  CRNA: ADINA LIMA    Indications and Patient Condition  Indications for airway management: airway protection    Preoxygenated: yes  Mask difficulty assessment: 2 - vent by mask + OA or adjuvant +/- NMBA    Final Airway Details  Final airway type: endotracheal airway      Successful airway: ETT  Cuffed: yes   Successful intubation technique: direct laryngoscopy  Facilitating devices/methods: intubating stylet  Endotracheal tube insertion site: oral  Blade: Gomez  Blade size: #2  ETT size: 7.0 mm  Cormack-Lehane Classification: grade I - full view of glottis  Placement verified by: chest auscultation and capnometry   Cuff volume (mL): 7  Measured from: lips  ETT to lips (cm): 20  Number of attempts at approach: 1    Additional Comments  EBBS: ETCO2+: Atraumatic intubation; Lips and teeth same as pre op

## 2018-04-09 NOTE — ANESTHESIA PREPROCEDURE EVALUATION
Anesthesia Evaluation     Patient summary reviewed and Nursing notes reviewed   no history of anesthetic complications:  NPO Solid Status: > 8 hours  NPO Liquid Status: > 8 hours           Airway   Mallampati: II  Dental - normal exam     Pulmonary - normal exam   Cardiovascular - normal exam    (+) valvular problems/murmurs murmur, hyperlipidemia,       Neuro/Psych  (+) numbness,     GI/Hepatic/Renal/Endo    (+)   hypothyroidism,     Musculoskeletal     Abdominal    Substance History      OB/GYN          Other                        Anesthesia Plan    ASA 2     general     intravenous induction   Anesthetic plan and risks discussed with patient.

## 2018-04-09 NOTE — ANESTHESIA POSTPROCEDURE EVALUATION
Patient: Cate Rosario    Procedure Summary     Date:  04/09/18 Room / Location:  [facility] OR [address] [place] [address] MAIN OR    Anesthesia Start:  1317 Anesthesia Stop:  1534    Procedure:  left L2-3, L5-S1 discectomies (Left Spine Lumbar) Diagnosis:       Lumbar herniated disc      (Lumbar herniated disc [M51.26])    Surgeon:  Rylan Castaneda MD Provider:  Ethel Ocampo MD    Anesthesia Type:  general ASA Status:  2          Anesthesia Type: general  Last vitals  BP   124/77 (04/09/18 1630)   Temp   36.6 °C (97.8 °F) (04/09/18 1531)   Pulse   71 (04/09/18 1630)   Resp   14 (04/09/18 1630)     SpO2   97 % (04/09/18 1630)     Post Anesthesia Care and Evaluation    Patient location during evaluation: PACU  Patient participation: complete - patient participated  Level of consciousness: awake and alert  Pain management: adequate  Airway patency: patent  Anesthetic complications: No anesthetic complications    Cardiovascular status: acceptable  Respiratory status: acceptable  Hydration status: acceptable

## 2018-04-10 VITALS
OXYGEN SATURATION: 96 % | HEART RATE: 82 BPM | WEIGHT: 157.19 LBS | TEMPERATURE: 98.9 F | HEIGHT: 63 IN | SYSTOLIC BLOOD PRESSURE: 112 MMHG | RESPIRATION RATE: 16 BRPM | BODY MASS INDEX: 27.85 KG/M2 | DIASTOLIC BLOOD PRESSURE: 63 MMHG

## 2018-04-10 PROCEDURE — 25010000003 CEFAZOLIN IN DEXTROSE 2-4 GM/100ML-% SOLUTION: Performed by: ORTHOPAEDIC SURGERY

## 2018-04-10 PROCEDURE — 99024 POSTOP FOLLOW-UP VISIT: CPT | Performed by: ORTHOPAEDIC SURGERY

## 2018-04-10 PROCEDURE — G0378 HOSPITAL OBSERVATION PER HR: HCPCS

## 2018-04-10 RX ORDER — OXYCODONE HYDROCHLORIDE AND ACETAMINOPHEN 5; 325 MG/1; MG/1
TABLET ORAL
Qty: 40 TABLET | Refills: 0 | Status: SHIPPED | OUTPATIENT
Start: 2018-04-10 | End: 2020-01-06

## 2018-04-10 RX ADMIN — LEVOTHYROXINE SODIUM 50 MCG: 50 TABLET ORAL at 07:07

## 2018-04-10 RX ADMIN — ATORVASTATIN CALCIUM 20 MG: 20 TABLET, FILM COATED ORAL at 07:07

## 2018-04-10 RX ADMIN — OXYCODONE HYDROCHLORIDE AND ACETAMINOPHEN 2 TABLET: 5; 325 TABLET ORAL at 10:03

## 2018-04-10 RX ADMIN — CEFAZOLIN SODIUM 2 G: 2 INJECTION, SOLUTION INTRAVENOUS at 05:40

## 2018-04-10 NOTE — DISCHARGE INSTRUCTIONS
EZIO CORONEL JR., M.D.   ORTHOPAEDIC SURGERY   [address] [address]       LUMBAR DISCECTOMY/LAMINECTOMY/DECOMPRESSION   HOME INSTRUCTIONS       1.     You will need to check your incision or have a family member to check your            incision EVERYDAY for the following signs:              Increase in redness            Increase in swelling around the incision or low back area            Increase in pain            Any drainage noted from the incision            Edges of the incision are pulling apart            Increase in overall body temperature (greater than 100.5 degrees)              Call your physician if any of these listed above occur after you are            discharged from the hospital.  DO NOT wait until your office visit to             inform the physician.     2.      Walking must be done on a daily basis.  You should walk at least twice a              day and more if you are able.  Start with short distances and gradually add            a little distance each day.     3.      There will be no formal physical therapy for 4 weeks unless you are having            trouble with transferring in and out of bed or problems with generalized            muscle weakness.     4.      You may be helpful to use an elevated toilet seat for six (6) weeks            following your surgery when you go to the bathroom.     5.      You may shower three (3) days after your surgery, as long as there is no         drainage.  The incision does not need to be covered.  You must keep the incision clean and dry after showering.     6.      You may want to sleep on a firm mattress.  Avoid waterbeds.           7.      We would prefer that you do not sit at all if possible.  You may sit for the            bathroom only if you use the toilet seat extender.  Sitting may be allowed            with meals occasionally, only if you use a barstool and sit at a high            kitchen counter,  otherwise, you will need to stand at the counter with            meals.     8.      Smoking should be avoided all together if possible.  If you are a smoker,            you should avoid smoking for 2-4 weeks from the time of your surgery.            Smoking may interfere with the wound healing.     9.      Avoid any lifting, pushing, or pulling of heavy objects after surgery.     10.    You may drive a car, usually 2 weeks after your surgery, but only            after you check with your physician.     11.    You may ride in a car for 30 minutes to an hour after the first 1-2 weeks              following your surgery.     12.    You may go home from the hospital in a car, but you must wear your            seat belt.     13.    You will be sent home with pain medication and it will only be used up            to 3-4 weeks after surgery.  Refills may be obtained, but ONLY during            office hours.     14.    Avoid any bending or twisting at the waist for six (6) weeks after surgery.     15.    You may go up and down stairs, but take it easy at first.  Avoid any            unnecessary trips up and down the stairs.     16.    Sexual activities should be avoided for two weeks after surgery.  After two            weeks, it is fine, but you should take it easy for at least six weeks.           17.    Return to the office in 2 weeks to see Dr. Castaneda.

## 2018-04-10 NOTE — PLAN OF CARE
Problem: Patient Care Overview  Goal: Plan of Care Review  Outcome: Ongoing (interventions implemented as appropriate)   04/09/18 1845 04/09/18 2130 04/10/18 0315   Plan of Care Review   Progress improving --  --    OTHER   Outcome Summary --  --  Pt. remains A&Ox4 throughout shift. Pain controlled well with only Dilaudid PCA. Up to bathroom with assist x1 during shift, voiding well. PT to see in am. Possible d/c in am.   Coping/Psychosocial   Plan of Care Reviewed With --  patient --      Goal: Individualization and Mutuality  Outcome: Ongoing (interventions implemented as appropriate)    Goal: Discharge Needs Assessment  Outcome: Ongoing (interventions implemented as appropriate)      Problem: Skin Injury Risk (Adult)  Goal: Identify Related Risk Factors and Signs and Symptoms  Outcome: Ongoing (interventions implemented as appropriate)    Goal: Skin Health and Integrity  Outcome: Ongoing (interventions implemented as appropriate)      Problem: Laminectomy/Foraminotomy/Discectomy (Adult)  Goal: Signs and Symptoms of Listed Potential Problems Will be Absent, Minimized or Managed (Laminectomy/Foraminotomy/Discectomy)  Outcome: Ongoing (interventions implemented as appropriate)

## 2018-04-10 NOTE — DISCHARGE SUMMARY
Orthopedic Discharge Summary      Patient: Cate Rosario  YOB: 1960  Medical Record Number: [id number]    Attending Physician: Rylan Castaneda MD  Consulting Physician(s):   Consults     No orders found for last 30 day(s).          Date of Admission: 2018  7:27 AM  Date of Discharge:4/10/2018    Discharge Diagnosis: left L2-3, L5-S1 discectomies,       Presenting Problem/History of Present Illness: Lumbar herniated disc [M51.26]  Lumbar herniated disc [M51.26]        Allergies: No Known Allergies    Discharge Medications   Cate Rosario   Home Medication Instructions YEN:[id number]    Printed on:04/10/18 0649   Medication Information                      aspirin 81 MG tablet  Take 81 mg by mouth Daily. Stopped 18             atorvastatin (LIPITOR) 20 MG tablet  Take 20 mg by mouth Every Morning.             levothyroxine (SYNTHROID, LEVOTHROID) 50 MCG tablet  Take 50 mcg by mouth Every Morning.             oxyCODONE-acetaminophen (PERCOCET) 5-325 MG per tablet  1-2 tablets by mouth every 4-6 hours when necessary pain             vitamin D (ERGOCALCIFEROL) 87336 units capsule capsule  50,000 Units Every 7 (Seven) Days.                   Past Medical History:   Diagnosis Date   • Arthritis     cervical   • Cancer     skin   • Heart murmur    • History of diverticulosis    • Hyperlipidemia    • Hypoglycemia     occ   • Hypothyroidism 2018   • Post-menopausal    • Sciatic nerve pain    • Thyroid disease         Past Surgical History:   Procedure Laterality Date   •  SECTION     • COLONOSCOPY N/A 2016    Procedure: COLONOSCOPY;  Surgeon: Juliane Chu MD;  Location: [facility];  Service:    • DILATATION AND CURETTAGE     • ENDOMETRIAL ABLATION     • LASER ABLATION     • LASIK     • TUBAL ABDOMINAL LIGATION          Social History     Occupational History   • Not on file.     Social History Main Topics   • Smoking status: Never Smoker   • Smokeless tobacco: Never  Used   • Alcohol use No      Comment: occ/social   • Drug use: No   • Sexual activity: Yes     Partners: Male     Birth control/ protection: Surgical      Comment: BTL    Social History     Social History Narrative   • No narrative on file        Family History   Problem Relation Age of Onset   • Thyroid disease Mother    • Heart disease Father    • Colon cancer Maternal Grandmother    • Breast cancer Neg Hx    • Ovarian cancer Neg Hx    • Malig Hyperthermia Neg Hx          Physical Exam: 57 y.o. female  General Appearance:    Alert, cooperative, in no acute distress                    Vitals:    04/09/18 1645 04/09/18 1700 04/09/18 2100 04/10/18 0510   BP:  122/69 109/66 112/63   BP Location:   Right arm Right arm   Patient Position:   Lying Lying   Pulse: 71 70 74 82   Resp: 16 16 16 16   Temp:   98.9 °F (37.2 °C) 98.9 °F (37.2 °C)   TempSrc:   Oral Oral   SpO2: 96% 98% 94% 96%   Weight:       Height:            DIAGNOSTIC TESTS:   Admission on 04/09/2018   Component Date Value Ref Range Status   • ABO Type 04/09/2018 O   Final   • RH type 04/09/2018 Negative   Final   • Antibody Screen 04/09/2018 Negative   Final   • T&S Expiration Date 04/09/2018 4/12/2018 11:59:59 PM   Final       No results found.    Hospital Course:  57 y.o. female admitted to [facility] to services of Rylan Castaneda MD with Lumbar herniated disc [M51.26]  Lumbar herniated disc [M51.26] on 4/9/2018 and underwent left L2-3, L5-S1 discectomies  Per Rylan Castaneda MD. Antibiotic and VTE prophylaxis were per [facility] protocols.  The patient was admitted to the floor where IV and/or oral pain medications were administered for postoperative pain.  At discharge the incisional pain was tolerable and preop neurologic function was intact.  The dressing was dry and the wound was clean.    Condition on Discharge:  Stable    Discharge Instructions: . Patient may weight bear as tolerated unless otherwise specified. Continue JIN hose daily (for two  weeks) and ice regularly. Patient also instructed on incentive spirometer during hospitalization and encouraged to continue to use at home regularly. Patient may shower on POD #3 if and when all wound drainage has stopped.  Where applicable, the brace should be worn when up and about.  It need not be worn to the bathroom and certainly not in the shower.  A detailed list of instructions specific to the operation was given to the patient at the time of discharge.    Follow up Instructions:  Follow up in the office with Dr. Ryaln Castaneda Jr. in 2-3 weeks - patient to call the office at [phone] to schedule. Prescriptions were given for pain medication.    Follow-up Appointments  Future Appointments  Date Time Provider Department Center   4/24/2018 2:50 PM Rylan Castaneda MD [facility] None         Discharge Disposition Plan:today to home    Date: 4/10/2018    Rylan Castaneda MD  04/10/18  6:49 AM

## 2018-05-04 ENCOUNTER — OFFICE VISIT (OUTPATIENT)
Dept: ORTHOPEDIC SURGERY | Facility: CLINIC | Age: 58
End: 2018-05-04

## 2018-05-04 VITALS — WEIGHT: 159 LBS | HEIGHT: 63 IN | BODY MASS INDEX: 28.17 KG/M2 | TEMPERATURE: 99.3 F

## 2018-05-04 DIAGNOSIS — Z98.890 S/P DISCECTOMY: Primary | ICD-10-CM

## 2018-05-04 PROCEDURE — 99024 POSTOP FOLLOW-UP VISIT: CPT | Performed by: ORTHOPAEDIC SURGERY

## 2018-05-04 NOTE — PROGRESS NOTES
He is 4 weeks out and doing great minimal residual left groin pain but all her other pains are gone the wounds are healed nicely and she's happy with the result I'm going to send her to physical therapy and then I'll see her back as needed.  I gave her a few instructions.

## 2018-05-09 ENCOUNTER — HOSPITAL ENCOUNTER (OUTPATIENT)
Dept: PHYSICAL THERAPY | Facility: HOSPITAL | Age: 58
Setting detail: THERAPIES SERIES
Discharge: HOME | End: 2018-05-09

## 2018-05-09 DIAGNOSIS — Z98.890 S/P LUMBAR DISCECTOMY: Primary | ICD-10-CM

## 2018-05-09 PROCEDURE — 97110 THERAPEUTIC EXERCISES: CPT

## 2018-05-09 PROCEDURE — G0283 ELEC STIM OTHER THAN WOUND: HCPCS

## 2018-05-09 PROCEDURE — 97161 PT EVAL LOW COMPLEX 20 MIN: CPT

## 2018-05-09 NOTE — THERAPY EVALUATION
Outpatient Physical Therapy Ortho Initial Evaluation   [place]     Patient Name: Cate Rosario  : 1960  MRN: [id number]  Today's Date: 2018      Visit Date: 2018    Patient Active Problem List   Diagnosis   • Hot flashes due to menopause   • Hypothyroidism   • Lumbar herniated disc        Past Medical History:   Diagnosis Date   • Arthritis     cervical   • Cancer     skin   • Heart murmur    • History of diverticulosis    • Hyperlipidemia    • Hypoglycemia     occ   • Hypothyroidism 2018   • Post-menopausal    • Sciatic nerve pain    • Thyroid disease         Past Surgical History:   Procedure Laterality Date   •  SECTION     • COLONOSCOPY N/A 2016    Procedure: COLONOSCOPY;  Surgeon: Juliane Chu MD;  Location: [facility] OR;  Service:    • DILATATION AND CURETTAGE     • ENDOMETRIAL ABLATION     • LASER ABLATION     • LASIK     • LUMBAR LAMINECTOMY DISCECTOMY DECOMPRESSION Left 2018    Procedure: left L2-3, L5-S1 discectomies;  Surgeon: Rylan Castaneda MD;  Location: [facility] OR;  Service: Neurosurgery   • TUBAL ABDOMINAL LIGATION         Visit Dx:     ICD-10-CM ICD-9-CM   1. S/P lumbar discectomy Z98.890 V45.89             Patient History     Row Name 18 1400             History    Chief Complaint Pain;Joint stiffness;Muscle tenderness;Muscle weakness;Difficulty with daily activities;Fatigue/poor endurance;Tightness  -CC      Type of Pain Back pain  -      Date Current Problem(s) Began 18  -CC      Brief Description of Current Complaint Pt with long standing hx of intermittent LBP (since teenager) -in 2108 pt had progressive and LLE radicular sx and failed conservative care. Pt underwent lumbar disectomy @ L2-3 and L 5-S1 on 18. 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 Dr Castaneda  -      How has patient tried to help current problem? Uses home eliptical daily for exercise -now up to 15 min  -CC         Pain     Pain Location --   L lumbar area  -CC      Pain at Present 2  -CC      Pain at Best 1  -CC      Pain at Worst 4  -CC      Pain Frequency Constant/continuous  -CC      Pain Description Aching  -CC      What Performance Factors Make the Current Problem(s) WORSE? ,, sitting- bending -vacuuming  -CC      What Performance Factors Make the Current Problem(s) BETTER? walking- changing postions  -CC      Tolerance Time- Standing 30 min  -CC      Tolerance Time- Sitting 15-20 min  -CC      Tolerance Time- Walking 30 min  -CC      Is your sleep disturbed? --   relates not due to back sx  -CC      What position do you sleep in? Right sidelying  -CC      Difficulties at work? Has not resumed part-time work- demands much standing and travel  -CC      Difficulties with ADL's? activities that demand bending and twisting  -CC      Difficulties with recreational activities? gardening  -         Fall Risk Assessment    Any falls in the past year: No  -CC         Daily Activities    Primary Language English  -CC      Are you able to read Yes  -CC      Are you able to write Yes  -CC      How does patient learn best? Reading  -CC      Teaching needs identified Home Exercise Program;Management of Condition  -CC      Patient is concerned about/has problems with Performing home management (household chores, shopping, care of dependents);Performing job responsibilities/community activities (work, school,;Performing sports, recreation, and play activities;Sitting;Standing;Walking  -CC      Does patient have problems with the following?  None  -CC      Pt Participated in POC and Goals Yes  -CC         Safety    Are you being hurt, hit, or frightened by anyone at home or in your life? No  -CC      Are you being neglected by a caregiver No  -CC        User Key  (r) = Recorded By, (t) = Taken By, (c) = Cosigned By    Initials Name Provider Type    CC Cadence Barron, PT Physical Therapist                PT Ortho     Row Name 05/09/18 1400       Subjective Comments    Subjective Comments Pr states she is doing better each day but has residual stiffness in back and weakness in back  -CC       Precautions and Contraindications    Precautions/Limitations lifting restrictions (specify in comments)   10-15 #  -CC       Posture/Observations    Forward Head Mild;Increased  -CC    Thoracic Kyphosis Mild;Decreased  -CC    Rounded Shoulders Mild;Increased  -CC    Scapular Elevation Left:;Mild;Moderate;Elevated  -CC    Scoliosis Mild;Moderate  -CC    Lumbar lordosis Moderate;Increased  -CC    Iliac crests Left:;Mild;Moderate;Elevated  -CC    Posture/Observations Comments Pt sits in chair and leans to R with most of weight on R with lateral trunk bend. Observe healing incisions at each level-L2-L3 lateral slight increase soft tissue raised  -CC       Neural Tension Signs- Lower Quarter Clearing    Slump Left:;Positive  -CC    SLR Left:;Positive  -CC       Lumbar ROM Screen- Lower Quarter Clearing    Lumbar Flexion Impaired   50 %  -CC    Lumbar Extension Impaired   full range but end range sx  -CC    Lumbar Lateral Flexion Impaired   end range sx  -CC    Lumbar Rotation Normal;Impaired   RR WNL- RL 50 % decrease  -CC       General ROM    GENERAL ROM COMMENTS B LE WNL  -CC       MMT (Manual Muscle Testing)    Additional Documentation --   RLE 5/5  -CC       Left Hip (Manual Muscle Testing)    Left Hip Manual Muscle Testing (MMT) flexion;extension;abduction;adduction  -CC    MMT: Flexion, Left Hip flexion  -CC    MMT, Gross Movement: Left Hip Flexion (4/5)  good  -CC    MMT: Extension, Left Hip extension  -CC    MMT, Gross Movement: Left Hip Extension (4-/5) good minus  -CC    MMT: ABduction, Left Hip abduction  -CC    MMT, Gross Movement: Left Hip ABduction (4+/5) good plus  -CC    MMT, Gross Movement: Left Hip ADduction (4-/5) good minus   ADD  -CC       Left Knee (Manual Muscle Testing)    MMT: Extension, Left Knee extension  -CC    MMT, Gross Movement: Left Knee Extension (4+/5) good plus  -CC    MMT: Flexion, Left Knee flexion  -CC    MMT, Gross Movement: Left Knee Flexion (4+/5) good plus  -CC       Left Ankle/Foot (Manual Muscle Testing)    Left Ankle Manual Muscle Testing (MMT) dorsiflexion;eversion  -CC    MMT: Dorsiflexion Left Ankle Muscles dorsiflexion  -CC    MMT, Gross Movement: Left Ankle Dorsiflexion (4+/5) good plus  -CC    MMT: Eversion, Left Ankle peroneus longus/brevis  -CC    MMT, Peroneus Longus & Brevis: Left Ankle Subtalar Eversion (4-/5) good minus  -CC       Sensation    Light Touch No apparent deficits  -CC       Lower Extremity Flexibility    Hamstrings Bilateral:;Mildly limited   R 70 degrees/L 60 degrees and sx in L lumbar  -CC    Quadriceps Bilateral:;WNL  -CC    ITB --   NT this session  -       Transfers    Comment (Transfers) Observed need to use UE to push up and pt with slow with mobility  -CC       Gait/Stairs Assessment/Training    Comment (Gait/Stairs) Observe mild antalgic gait with slight lateral trunk bed to R and decrease WB LLE -slower gunner  -      User Key  (r) = Recorded By, (t) = Taken By, (c) = Cosigned By    Initials Name Provider Type    CC Cadence Barron PT Physical Therapist                      Therapy Education  Education Details: Issued handout and green TB for hooklying ABD   Given: HEP  Program: New  How Provided: Verbal, Written  Provided to: Patient  Level of Understanding: Demonstrated           PT OP Goals     Row Name 05/09/18 1400          PT Short Term Goals    STG Date to Achieve 06/08/18   -CC     STG 1 Pt performs HEP on daily basis  -CC     STG 2 Resolve neural tension L hamstring and increase by 5-10 degrees  -CC     STG 3 Observe improved sitting in neutral postion vs lateral bending to R  -CC     STG 4 Pt progress to advance lumbar stabilization exercises and performs 10-20 reps  -CC     STG 5 Pt reports can vacuuming 15-20 min at home without increase sx  -CC       User Key  (r) = Recorded By, (t) = Taken By, (c) = Cosigned By    Initials Name Provider Type    CC Cadence Barron PT Physical Therapist                PT Assessment/Plan     Row Name 05/09/18 1400          PT Assessment    Functional Limitations Impaired locomotion;Limitation in home management;Limitations in community activities;Performance in leisure activities;Performance in work activities  -     Impairments Range of motion;Muscle strength;Pain;Impaired flexibility;Posture;Gait  -CC     Assessment Comments Pt 4 weeks s/p lumbar disectomy at L2-3 and L5-S1. Pt presents with decrease spinal ROM  and muscle strength L hip and core muscles- decrease flexiblity L hamstrings-decrease tolerance to ADL postioning and mobility. Pt appears motivated to participate in PT to resume previous level of activites.  -CC     Please refer to paper survey for additional self-reported information Yes   Back Index score 40  -CC     Rehab Potential Good  -CC     Patient/caregiver participated in establishment of treatment plan and goals Yes  -CC     Patient would benefit from skilled therapy intervention Yes  -CC        PT Plan    PT Frequency 2x/week  -CC     Predicted Duration of Therapy Intervention (OT Eval) 4 weeks  -CC     Planned CPT's? PT EVAL LOW COMPLEXITY: 67183;PT HOT OR COLD PACK TREAT MCARE;PT THER PROC EA 15 MIN: 82544;PT ELECTRICAL STIM UNATTEND: ;PT ULTRASOUND EA 15 MIN: 00333  -CC     Physical Therapy Interventions (Optional Details) strengthening;stretching;modalities;taping;home exercise program;manual therapy  techniques;patient/family education  -CC     PT Plan Comments Per Plan  -CC       User Key  (r) = Recorded By, (t) = Taken By, (c) = Cosigned By    Initials Name Provider Type    CC Cadence Barron, PT Physical Therapist                Modalities     Row Name 05/09/18 1400             Ice    Ice Applied Yes  -CC      Location lumbar spine -pt in supine with IFC and legs on stool  -CC      Rx Minutes 15 mins  -CC      Ice S/P Rx Yes  -CC         ELECTRICAL STIMULATION    Attended/Unattended Unattended  -CC      Stimulation Type IFC  -CC      Location/Electrode Placement/Other L lumbar spine with CO -pt postioned as above  -CC        User Key  (r) = Recorded By, (t) = Taken By, (c) = Cosigned By    Initials Name Provider Type    CC Cadence Barron, PT Physical Therapist              Exercises     Row Name 05/09/18 1400             Subjective Comments    Subjective Comments Pr states she is doing better each day but has residual stiffness in back and weakness in back  -CC         Exercise 1    Exercise Name 1 PPT  -CC      Cueing 1 Verbal;Tactile  -CC      Reps 1 5  -CC      Time 1 10 sec  -CC         Exercise 2    Exercise Name 2 B SKC   -CC      Cueing 2 Verbal;Tactile  -CC      Reps 2 5   use of sheet  -CC      Time 2 10 sec  -CC         Exercise 3    Exercise Name 3 B LTR  -CC      Cueing 3 Verbal;Tactile  -CC      Reps 3 5 sec  -CC      Time 3 10sec  -CC         Exercise 4    Exercise Name 4 L piriformis stretch with sheet  -CC      Cueing 4 Verbal;Tactile  -CC      Reps 4 2  -CC      Time 4 20 sec  -CC         Exercise 5    Exercise Name 5 L neural glide /DF osscilations with sheet  -CC      Cueing 5 Verbal;Demo  -CC      Reps 5 20  -CC         Exercise 6    Exercise Name 6 Hooklying with PPT vs ADD  -CC      Cueing 6 Verbal;Tactile  -CC      Reps 6 10 with ball  -CC      Time 6 5 sec  -CC         Exercise 7    Exercise Name 7 Hooklying with PPT vs ABD vs  -CC      Cueing 7 Verbal;Tactile  -CC       Reps 7 10-use of green TB  -CC      Time 7 10   -CC      Additional Comments 5 sec  -CC        User Key  (r) = Recorded By, (t) = Taken By, (c) = Cosigned By    Initials Name Provider Type    CC Cadence Barron, PT Physical Therapist                                  Time Calculation:   Start Time: 1400  Stop Time: 1500  Time Calculation (min): 60 min     Therapy Charges for Today     Code Description Service Date Service Provider Modifiers Qty    12274474840 HC PT EVAL LOW COMPLEXITY 2 5/9/2018 Cadence Barron, PT GP 1    44900901943 HC PT THER PROC EA 15 MIN 5/9/2018 Cadence Barron, PT GP 1    40748173623 HC PT HOT OR COLD PACK TREAT MCARE 5/9/2018 Cadence Barron, PT GP 1    03056393285 HC PT ELECTRICAL STIM UNATTENDED 5/9/2018 Cadence Barron, PT  1                    Cadence Barron, PT  5/9/2018

## 2018-05-11 ENCOUNTER — HOSPITAL ENCOUNTER (OUTPATIENT)
Dept: PHYSICAL THERAPY | Facility: HOSPITAL | Age: 58
Setting detail: THERAPIES SERIES
Discharge: HOME | End: 2018-05-11

## 2018-05-11 DIAGNOSIS — Z98.890 S/P LUMBAR DISCECTOMY: Primary | ICD-10-CM

## 2018-05-11 PROCEDURE — 97110 THERAPEUTIC EXERCISES: CPT

## 2018-05-11 PROCEDURE — G0283 ELEC STIM OTHER THAN WOUND: HCPCS

## 2018-05-11 NOTE — THERAPY TREATMENT NOTE
Outpatient Physical Therapy Ortho Treatment Note   [place]     Patient Name: Cate Rosario  : 1960  MRN: [id number]  Today's Date: 2018      Visit Date: 2018    Visit Dx:    ICD-10-CM ICD-9-CM   1. S/P lumbar discectomy Z98.890 V45.89       Patient Active Problem List   Diagnosis   • Hot flashes due to menopause   • Hypothyroidism   • Lumbar herniated disc        Past Medical History:   Diagnosis Date   • Arthritis     cervical   • Cancer     skin   • Heart murmur    • History of diverticulosis    • Hyperlipidemia    • Hypoglycemia     occ   • Hypothyroidism 2018   • Post-menopausal    • Sciatic nerve pain    • Thyroid disease         Past Surgical History:   Procedure Laterality Date   •  SECTION     • COLONOSCOPY N/A 2016    Procedure: COLONOSCOPY;  Surgeon: Juliane Chu MD;  Location: [facility];  Service:    • DILATATION AND CURETTAGE     • ENDOMETRIAL ABLATION     • LASER ABLATION     • LASIK     • LUMBAR LAMINECTOMY DISCECTOMY DECOMPRESSION Left 2018    Procedure: left L2-3, L5-S1 discectomies;  Surgeon: Rylan Castaneda MD;  Location: [facility];  Service: Neurosurgery   • TUBAL ABDOMINAL LIGATION               PT Ortho     Row Name 18 1400       Subjective Comments    Subjective Comments Pr states she is doing better each day but has residual stiffness in back and weakness in back  -CC       Precautions and Contraindications    Precautions/Limitations lifting restrictions (specify in comments)   10-15 #  -CC       Posture/Observations    Forward Head Mild;Increased  -CC    Thoracic Kyphosis Mild;Decreased  -CC    Rounded Shoulders Mild;Increased  -CC    Scapular Elevation Left:;Mild;Moderate;Elevated  -CC    Scoliosis Mild;Moderate  -CC    Lumbar lordosis Moderate;Increased  -CC    Iliac crests Left:;Mild;Moderate;Elevated  -CC    Posture/Observations Comments Pt sits in chair and leans to R with most of weight on R with lateral trunk  bend. Observe healing incisions at each level-L2-L3 lateral slight increase soft tissue raised  -CC       Neural Tension Signs- Lower Quarter Clearing    Slump Left:;Positive  -CC    SLR Left:;Positive  -CC       Lumbar ROM Screen- Lower Quarter Clearing    Lumbar Flexion Impaired   50 %  -CC    Lumbar Extension Impaired   full range but end range sx  -CC    Lumbar Lateral Flexion Impaired   end range sx  -CC    Lumbar Rotation Normal;Impaired   RR WNL- RL 50 % decrease  -CC       General ROM    GENERAL ROM COMMENTS B LE WNL  -CC       MMT (Manual Muscle Testing)    Additional Documentation --   RLE 5/5  -CC       Left Hip (Manual Muscle Testing)    Left Hip Manual Muscle Testing (MMT) flexion;extension;abduction;adduction  -CC    MMT: Flexion, Left Hip flexion  -CC    MMT, Gross Movement: Left Hip Flexion (4/5) good  -CC    MMT: Extension, Left Hip extension  -CC    MMT, Gross Movement: Left Hip Extension (4-/5) good minus  -CC    MMT: ABduction, Left Hip abduction  -CC    MMT, Gross Movement: Left Hip ABduction (4+/5) good plus  -CC    MMT, Gross Movement: Left Hip ADduction (4-/5) good minus   ADD  -CC       Left Knee (Manual Muscle Testing)    MMT: Extension, Left Knee extension  -CC    MMT, Gross Movement: Left Knee Extension (4+/5) good plus  -CC    MMT: Flexion, Left Knee flexion  -CC    MMT, Gross Movement: Left Knee Flexion (4+/5) good plus  -CC       Left Ankle/Foot (Manual Muscle Testing)    Left Ankle Manual Muscle Testing (MMT) dorsiflexion;eversion  -CC    MMT: Dorsiflexion Left Ankle Muscles dorsiflexion  -CC    MMT, Gross Movement: Left Ankle Dorsiflexion (4+/5) good plus  -CC    MMT: Eversion, Left Ankle peroneus longus/brevis  -CC    MMT, Peroneus Longus & Brevis: Left Ankle Subtalar Eversion (4-/5) good minus  -CC       Sensation    Light Touch No apparent deficits  -CC       Lower Extremity Flexibility    Hamstrings Bilateral:;Mildly limited   R 70 degrees/L 60 degrees and sx in L lumbar  -CC     Quadriceps Bilateral:;WNL  -CC    ITB --   NT this session  -CC       Transfers    Comment (Transfers) Observed need to use UE to push up and pt with slow with mobility  -CC       Gait/Stairs Assessment/Training    Comment (Gait/Stairs) Observe mild antalgic gait with slight lateral trunk bed to R and decrease WB LLE -slower gunner  -CC      User Key  (r) = Recorded By, (t) = Taken By, (c) = Cosigned By    Initials Name Provider Type    CC Cadence Barron, PT Physical Therapist                            PT Assessment/Plan     Row Name 05/11/18 1453          PT Assessment    Assessment Comments Pt relates some improved spinal flexability following 1st PT session. Pt tolerated exercises well including advancing lumbar stabilzation exercises  -CC        PT Plan    PT Plan Comments Continue per plan-progress lumbar stabilization program  -CC       User Key  (r) = Recorded By, (t) = Taken By, (c) = Cosigned By    Initials Name Provider Type    CC Cadence Barron, PT Physical Therapist                Modalities     Row Name 05/11/18 1400             Ice    Location lumbar spine -pt in supine with IFC and legs on stool  -CC      Rx Minutes 15 mins  -CC      Ice S/P Rx Yes  -CC         ELECTRICAL STIMULATION    Attended/Unattended Unattended  -CC      Stimulation Type IFC  -CC      Location/Electrode Placement/Other L lumbar spine with CO -pt postioned as above  -CC        User Key  (r) = Recorded By, (t) = Taken By, (c) = Cosigned By    Initials Name Provider Type    CHAU Barron, PT Physical Therapist                Exercises     Row Name 05/11/18 1400             Exercise 1    Exercise Name 1 PPT  -CC      Cueing 1 Verbal;Tactile  -CC      Reps 1 10  -CC      Time 1 10 sec  -CC         Exercise 2    Exercise Name 2 B SKC   -CC      Cueing 2 Verbal;Tactile  -CC      Reps 2 5   use of sheet  -CC      Time 2 10 sec  -CC         Exercise 3    Exercise Name 3 B LTR  -CC      Cueing 3  Verbal;Tactile  -CC      Reps 3 5 sec  -CC      Time 3 10sec  -CC         Exercise 4    Exercise Name 4 L piriformis stretch with sheet  -CC      Cueing 4 Verbal;Tactile  -CC      Reps 4 2  -CC      Time 4 20 sec  -CC         Exercise 5    Exercise Name 5 L neural glide /DF osscilations with sheet  -CC      Cueing 5 Verbal;Demo  -CC      Reps 5 20  -CC         Exercise 6    Exercise Name 6 Hooklying with PPT vs ADD  -CC      Cueing 6 Verbal;Tactile  -CC      Reps 6 10 with ball  -CC      Time 6 5 sec  -CC         Exercise 7    Exercise Name 7 Hooklying with PPT vs ABD vs  -CC      Cueing 7 Verbal;Tactile  -CC      Reps 7 10-use of green TB  -CC      Time 7 10   -CC         Exercise 8    Exercise Name 8 bridges  -CC      Cueing 8 Verbal  -CC      Reps 8 5  -CC         Exercise 9    Exercise Name 9 Lower AB marches with PPT  -CC      Cueing 9 Verbal;Tactile  -CC      Time 9 10 ea  -CC        User Key  (r) = Recorded By, (t) = Taken By, (c) = Cosigned By    Initials Name Provider Type    CHAU Barron PT Physical Therapist                               PT OP Goals     Row Name 05/11/18 1400          PT Short Term Goals    STG Date to Achieve 06/08/18  -CC     STG 1 Pt performs HEP on daily basis  -CC     STG 2 Resolve neural tension L hamstring and increase by 5-10 degrees  -CC     STG 3 Observe improved sitting in neutral postion vs lateral bending to R  -CC     STG 4 Pt progress to advance lumbar stabilization exercises and performs 10-20 reps  -CC     STG 5 Pt reports can vacuuming 15-20 min at home without increase sx  -CC       User Key  (r) = Recorded By, (t) = Taken By, (c) = Cosigned By    Initials Name Provider Type    CHAU Barron PT Physical Therapist          Therapy Education  Education Details: Issued handout for lumbar stabilization HEP  Given: HEP  Program: New  How Provided: Verbal, Written  Provided to: Patient  Level of Understanding: Demonstrated              Time  Calculation:   Start Time: 1345  Stop Time: 1440  Time Calculation (min): 55 min    Therapy Charges for Today     Code Description Service Date Service Provider Modifiers Qty    92726929558 HC PT THER PROC EA 15 MIN 5/11/2018 Cadence Barron, PT GP 2    06750037456 HC PT HOT OR COLD PACK TREAT MCARE 5/11/2018 Cadence Barron, PT GP 1    82652647055 HC PT ELECTRICAL STIM UNATTENDED 5/11/2018 Cadence Barron, PT  1                    Cadence Barron, PT  5/11/2018

## 2018-05-15 ENCOUNTER — HOSPITAL ENCOUNTER (OUTPATIENT)
Dept: PHYSICAL THERAPY | Facility: HOSPITAL | Age: 58
Setting detail: THERAPIES SERIES
Discharge: HOME | End: 2018-05-15

## 2018-05-18 ENCOUNTER — HOSPITAL ENCOUNTER (OUTPATIENT)
Dept: PHYSICAL THERAPY | Facility: HOSPITAL | Age: 58
Setting detail: THERAPIES SERIES
Discharge: HOME | End: 2018-05-18

## 2018-05-18 DIAGNOSIS — Z98.890 S/P LUMBAR DISCECTOMY: Primary | ICD-10-CM

## 2018-05-18 PROCEDURE — G0283 ELEC STIM OTHER THAN WOUND: HCPCS

## 2018-05-18 PROCEDURE — 97110 THERAPEUTIC EXERCISES: CPT

## 2018-05-18 NOTE — THERAPY TREATMENT NOTE
Outpatient Physical Therapy Ortho Treatment Note   [facility]     Patient Name: Cate Rosario  : 1960  MRN: [id number]  Today's Date: 2018      Visit Date: 2018    Visit Dx:    ICD-10-CM ICD-9-CM   1. S/P lumbar discectomy Z98.890 V45.89       Patient Active Problem List   Diagnosis   • Hot flashes due to menopause   • Hypothyroidism   • Lumbar herniated disc        Past Medical History:   Diagnosis Date   • Arthritis     cervical   • Cancer     skin   • Heart murmur    • History of diverticulosis    • Hyperlipidemia    • Hypoglycemia     occ   • Hypothyroidism 2018   • Post-menopausal    • Sciatic nerve pain    • Thyroid disease         Past Surgical History:   Procedure Laterality Date   •  SECTION     • COLONOSCOPY N/A 2016    Procedure: COLONOSCOPY;  Surgeon: Juliane Chu MD;  Location: [facility];  Service:    • DILATATION AND CURETTAGE     • ENDOMETRIAL ABLATION     • LASER ABLATION     • LASIK     • LUMBAR LAMINECTOMY DISCECTOMY DECOMPRESSION Left 2018    Procedure: left L2-3, L5-S1 discectomies;  Surgeon: Rylan Castaneda MD;  Location: [facility];  Service: Neurosurgery   • TUBAL ABDOMINAL LIGATION                               PT Assessment/Plan     Row Name 18 1442          PT Assessment    Assessment Comments Pt making gains in all areas toward goals  -CC        PT Plan    PT Plan Comments Continue per plan-progress lumbar stabilization program as tolerated  -CC       User Key  (r) = Recorded By, (t) = Taken By, (c) = Cosigned By    Initials Name Provider Type    CC Cadence Barron, PT Physical Therapist                Modalities     Row Name 18 1400             Subjective Comments    Subjective Comments Pt continues to report increase comfort in back doing tasks around the home  -CC         Subjective Pain    Able to rate subjective pain? yes  -CC      Pre-Treatment Pain Level 1  -CC      Subjective Pain Comment Pt relates  can tell the abdominal exercises are working -her clothes are fitting better at waist  -CC         Ice    Location lumbar spine -pt in supine with IFC and legs on stool  -CC      Rx Minutes 15 mins  -CC      Ice S/P Rx Yes  -CC         ELECTRICAL STIMULATION    Attended/Unattended Unattended  -CC      Stimulation Type IFC  -CC      Location/Electrode Placement/Other L lumbar spine with CO -pt postioned as above  -CC        User Key  (r) = Recorded By, (t) = Taken By, (c) = Cosigned By    Initials Name Provider Type     Cadence Barron PT Physical Therapist                Exercises     Row Name 05/18/18 1400             Subjective Comments    Subjective Comments Pt continues to report increase comfort in back doing tasks around the home  -CC         Subjective Pain    Able to rate subjective pain? yes  -CC      Pre-Treatment Pain Level 1  -CC      Subjective Pain Comment Pt relates can tell the abdominal exercises are working -her clothes are fitting better at waist  -CC         Exercise 1    Exercise Name 1 PPT  -CC      Cueing 1 Verbal;Tactile  -CC      Reps 1 10  -CC      Time 1 10 sec  -CC         Exercise 2    Exercise Name 2 B SKC   -CC      Cueing 2 Verbal;Tactile  -CC      Reps 2 5   use of sheet  -CC      Time 2 10 sec  -CC         Exercise 3    Exercise Name 3 B LTR  -CC      Cueing 3 Verbal;Tactile  -CC      Reps 3 5 sec  -CC      Time 3 10sec  -CC         Exercise 4    Exercise Name 4 L piriformis stretch with sheet  -CC      Cueing 4 Verbal;Tactile  -CC      Reps 4 2  -CC      Time 4 20 sec  -CC         Exercise 5    Exercise Name 5 L neural glide /DF osscilations with sheet  -CC      Cueing 5 Verbal;Demo  -CC      Reps 5 20  -CC         Exercise 6    Exercise Name 6 Hooklying with PPT vs ADD  -CC      Cueing 6 Verbal;Tactile  -CC      Reps 6 10 with ball  -CC      Time 6 5 sec  -CC         Exercise 7    Exercise Name 7 Hooklying with PPT vs ABD vs  -CC      Cueing 7 Verbal;Tactile  -CC       Reps 7 10-use of blue TB  -CC      Time 7 10   -CC         Exercise 8    Exercise Name 8 bridges  -CC      Cueing 8 Verbal  -CC      Reps 8 10  -CC         Exercise 9    Exercise Name 9 Lower AB marches with PPT and UE raise  -CC      Cueing 9 Verbal;Tactile  -CC      Time 9 10 ea   use of 1# wts in UE  -CC         Exercise 10    Exercise Name 10 PLR  -CC      Cueing 10 Verbal;Tactile  -CC      Reps 10 x 10 each -use of 2 pillows  -CC         Exercise 11    Exercise Name 11 Quadruped UE raise  -CC      Cueing 11 Verbal;Tactile  -CC      Reps 11 x10 each  -CC         Exercise 12    Exercise Name 12 B extension vs TB  -CC      Cueing 12 Verbal;Demo  -CC      Reps 12 x 10 -use of blue TB  -CC        User Key  (r) = Recorded By, (t) = Taken By, (c) = Cosigned By    Initials Name Provider Type    CHAU Barron, PT Physical Therapist                               PT OP Goals     Row Name 05/18/18 1400          PT Short Term Goals    STG Date to Achieve 06/08/18  -CC     STG 1 Pt performs HEP on daily basis  -CC     STG 2 Resolve neural tension L hamstring and increase by 5-10 degrees  -CC     STG 3 Observe improved sitting in neutral postion vs lateral bending to R  -CC     STG 4 Pt progress to advance lumbar stabilization exercises and performs 10-20 reps  -CC     STG 5 Pt reports can vacuuming 15-20 min at home without increase sx  -CC       User Key  (r) = Recorded By, (t) = Taken By, (c) = Cosigned By    Initials Name Provider Type    CHAU Barron, PT Physical Therapist          Therapy Education  Education Details: issued handout for quadruped UE raise  Given: HEP  Program: Reinforced  How Provided: Verbal, Written  Provided to: Patient  Level of Understanding: Demonstrated              Time Calculation:   Start Time: 1400  Stop Time: 1445    Therapy Charges for Today     Code Description Service Date Service Provider Modifiers Qty    66513939964 HC PT ELECTRICAL STIM UNATTENDED 5/18/2018  Cadence Barron, PT  1    06717699900 HC PT HOT OR COLD PACK TREAT MCARE 5/18/2018 Cadence Barron, PT GP 1    14522098236 HC PT THER PROC EA 15 MIN 5/18/2018 Cadence Barron, PT GP 2                    Cadence Barron, PT  5/18/2018

## 2018-05-22 ENCOUNTER — HOSPITAL ENCOUNTER (OUTPATIENT)
Dept: PHYSICAL THERAPY | Facility: HOSPITAL | Age: 58
Setting detail: THERAPIES SERIES
Discharge: HOME | End: 2018-05-22

## 2018-05-22 DIAGNOSIS — Z98.890 S/P LUMBAR DISCECTOMY: Primary | ICD-10-CM

## 2018-05-22 PROCEDURE — G0283 ELEC STIM OTHER THAN WOUND: HCPCS

## 2018-05-22 PROCEDURE — 97110 THERAPEUTIC EXERCISES: CPT

## 2018-05-22 NOTE — THERAPY TREATMENT NOTE
Outpatient Physical Therapy Ortho Treatment Note   [facility]     Patient Name: Cate Rosario  : 1960  MRN: [id number]  Today's Date: 2018      Visit Date: 2018    Visit Dx:    ICD-10-CM ICD-9-CM   1. S/P lumbar discectomy Z98.890 V45.89       Patient Active Problem List   Diagnosis   • Hot flashes due to menopause   • Hypothyroidism   • Lumbar herniated disc        Past Medical History:   Diagnosis Date   • Arthritis     cervical   • Cancer     skin   • Heart murmur    • History of diverticulosis    • Hyperlipidemia    • Hypoglycemia     occ   • Hypothyroidism 2018   • Post-menopausal    • Sciatic nerve pain    • Thyroid disease         Past Surgical History:   Procedure Laterality Date   •  SECTION     • COLONOSCOPY N/A 2016    Procedure: COLONOSCOPY;  Surgeon: Juliane Chu MD;  Location: [facility];  Service:    • DILATATION AND CURETTAGE     • ENDOMETRIAL ABLATION     • LASER ABLATION     • LASIK     • LUMBAR LAMINECTOMY DISCECTOMY DECOMPRESSION Left 2018    Procedure: left L2-3, L5-S1 discectomies;  Surgeon: Rylan Castaneda MD;  Location: [facility];  Service: Neurosurgery   • TUBAL ABDOMINAL LIGATION                               PT Assessment/Plan     Row Name 18 1508          PT Assessment    Assessment Comments Observed pt lifting LLE beyond neutral with PLR which may be reason for increase soreness L lumbar muscles. Pt had improved pelvic alignment after MET.  -CC        PT Plan    PT Plan Comments Continue per plan-check response to MET   -CC       User Key  (r) = Recorded By, (t) = Taken By, (c) = Cosigned By    Initials Name Provider Type    CC Cadence Barron, PT Physical Therapist                Modalities     Row Name 18 1400             Ice    Location lumbar spine -pt in supine with IFC and legs on stool  -CC      Rx Minutes 15 mins  -CC      Ice S/P Rx Yes  -CC         ELECTRICAL STIMULATION    Attended/Unattended  Unattended  -CC      Stimulation Type IFC  -CC      Location/Electrode Placement/Other L lumbar spine with CP-pt postioned as above  -CC        User Key  (r) = Recorded By, (t) = Taken By, (c) = Cosigned By    Initials Name Provider Type    CHAU Barron, PT Physical Therapist                Exercises     Row Name 05/22/18 1400             Subjective Comments    Subjective Comments Pt has c/o of some mild increase soreness L lumbar paraspinals past couple days but unable to identify reason  -CC         Subjective Pain    Able to rate subjective pain? yes  -CC      Pre-Treatment Pain Level 1  -CC      Post-Treatment Pain Level 1  -CC         Exercise 1    Exercise Name 1 PPT  -CC      Cueing 1 Verbal;Tactile  -CC      Reps 1 10  -CC      Time 1 10 sec  -CC         Exercise 2    Exercise Name 2 B SKC   -CC      Cueing 2 Verbal;Tactile  -CC      Reps 2 5   use of sheet  -CC      Time 2 10 sec  -CC         Exercise 3    Exercise Name 3 B LTR  -CC      Cueing 3 Verbal;Tactile  -CC      Reps 3 5 sec  -CC      Time 3 10sec  -CC         Exercise 4    Exercise Name 4 L piriformis stretch with sheet  -CC      Cueing 4 Verbal;Tactile  -CC      Reps 4 2  -CC      Time 4 20 sec  -CC         Exercise 5    Exercise Name 5 L neural glide /DF osscilations with sheet  -CC      Cueing 5 Verbal;Demo  -CC      Reps 5 20  -CC         Exercise 6    Exercise Name 6 Hooklying with PPT vs ADD  -CC      Cueing 6 Verbal;Tactile  -CC      Reps 6 10 with ball  -CC      Time 6 5 sec  -CC         Exercise 7    Exercise Name 7 Hooklying with PPT vs ABD vs  -CC      Cueing 7 Verbal;Tactile  -CC      Reps 7 10-use of blue TB  -CC      Time 7 10   -CC         Exercise 8    Exercise Name 8 bridges  -CC      Cueing 8 Verbal  -CC      Reps 8 10  -CC      Time 8 5 sec  -CC         Exercise 9    Exercise Name 9 Lower AB marches with PPT and UE raise  -CC      Cueing 9 Verbal;Tactile  -CC      Time 9 10 ea   use of 2# wts in UE  -CC          Exercise 10    Exercise Name 10 PLR  -CC      Cueing 10 Verbal;Tactile  -CC      Reps 10 x 10 each -use of 2 pillows  -CC         Exercise 11    Exercise Name 11 Quadruped UE raise  -CC      Cueing 11 Verbal;Tactile  -CC      Reps 11 x10 each add 1# UE  -CC         Exercise 12    Exercise Name 12 B extension vs TB  -CC      Cueing 12 Verbal;Demo  -CC      Reps 12 x 10 -use of blue TB  -CC        User Key  (r) = Recorded By, (t) = Taken By, (c) = Cosigned By    Initials Name Provider Type    CHAU Barron, PT Physical Therapist                        Manual Rx (last 36 hours)      Manual Treatments     Row Name 05/22/18 1400             Manual Rx 1    Manual Rx 1 Location pelvis  -CC      Manual Rx 1 Type MET  -CC      Manual Rx 1 Grade shotgun- post rot L  -CC        User Key  (r) = Recorded By, (t) = Taken By, (c) = Cosigned By    Initials Name Provider Type    CHAU Barron PT Physical Therapist                PT OP Goals     Row Name 05/22/18 1400          PT Short Term Goals    STG Date to Achieve 06/08/18  -CC     STG 1 Pt performs HEP on daily basis  -CC     STG 2 Resolve neural tension L hamstring and increase by 5-10 degrees  -CC     STG 3 Observe improved sitting in neutral postion vs lateral bending to R  -CC     STG 4 Pt progress to advance lumbar stabilization exercises and performs 10-20 reps  -CC     STG 5 Pt reports can vacuuming 15-20 min at home without increase sx  -CC       User Key  (r) = Recorded By, (t) = Taken By, (c) = Cosigned By    Initials Name Provider Type    CHAU Barron, YANET Physical Therapist          Therapy Education  Education Details: use of 1# wt for quadruped  UE raise  Program: New  How Provided: Verbal  Provided to: Patient  Level of Understanding: Demonstrated              Time Calculation:   Start Time: 1400  Stop Time: 1451  Time Calculation (min): 51 min    Therapy Charges for Today     Code Description Service Date Service Provider  Modifiers Qty    04254538970 HC PT THER PROC EA 15 MIN 5/22/2018 Cadence Barron, PT GP 2    22033915609 HC PT HOT OR COLD PACK TREAT MCARE 5/22/2018 Cadence Barron, PT GP 1    08366919482 HC PT ELECTRICAL STIM UNATTENDED 5/22/2018 Cadence Barron, PT  1                    Cadence Barron, PT  5/22/2018

## 2018-05-25 ENCOUNTER — HOSPITAL ENCOUNTER (OUTPATIENT)
Dept: PHYSICAL THERAPY | Facility: HOSPITAL | Age: 58
Setting detail: THERAPIES SERIES
Discharge: HOME | End: 2018-05-25

## 2018-05-25 DIAGNOSIS — Z98.890 S/P LUMBAR DISCECTOMY: Primary | ICD-10-CM

## 2018-05-25 PROCEDURE — 97110 THERAPEUTIC EXERCISES: CPT

## 2018-05-25 PROCEDURE — G0283 ELEC STIM OTHER THAN WOUND: HCPCS

## 2018-05-25 NOTE — THERAPY TREATMENT NOTE
Outpatient Physical Therapy Ortho Treatment Note   [place]     Patient Name: Cate Rosario  : 1960  MRN: [id number]  Today's Date: 2018      Visit Date: 2018    Visit Dx:    ICD-10-CM ICD-9-CM   1. S/P lumbar discectomy Z98.890 V45.89       Patient Active Problem List   Diagnosis   • Hot flashes due to menopause   • Hypothyroidism   • Lumbar herniated disc        Past Medical History:   Diagnosis Date   • Arthritis     cervical   • Cancer     skin   • Heart murmur    • History of diverticulosis    • Hyperlipidemia    • Hypoglycemia     occ   • Hypothyroidism 2018   • Post-menopausal    • Sciatic nerve pain    • Thyroid disease         Past Surgical History:   Procedure Laterality Date   •  SECTION     • COLONOSCOPY N/A 2016    Procedure: COLONOSCOPY;  Surgeon: Juliane Chu MD;  Location: [facility];  Service:    • DILATATION AND CURETTAGE     • ENDOMETRIAL ABLATION     • LASER ABLATION     • LASIK     • LUMBAR LAMINECTOMY DISCECTOMY DECOMPRESSION Left 2018    Procedure: left L2-3, L5-S1 discectomies;  Surgeon: Rylan Castaneda MD;  Location: [facility];  Service: Neurosurgery   • TUBAL ABDOMINAL LIGATION                               PT Assessment/Plan     Row Name 18 [id number]          PT Assessment    Assessment Comments Pt showing symetrical pelvic alignment maintaining from last session. Pt has made satisfactory progress with all goals met. Pt states she is ready to continue independently with strengthening program. Discussed with pt how to progress to advance lumbar stabilization exercises.                                                                                                                                                       -CC        PT Plan    PT Plan Comments Plan is for pt to call if needs to resume PT sessions. If no contact next 2-3 weeks will discharge  -CC       User Key  (r) = Recorded By, (t) = Taken By, (c) = Cosigned By     Initials Name Provider Type    CC Cadence Barron, PT Physical Therapist                Modalities     Row Name 05/25/18 1400             Subjective Comments    Subjective Comments Pt states she feels she is doing better each day  -CC         Subjective Pain    Able to rate subjective pain? yes  -CC      Pre-Treatment Pain Level 1  -CC      Post-Treatment Pain Level --   less than at onset of session  -CC         Ice    Location lumbar spine -pt in supine with IFC and legs on stool  -CC      Rx Minutes 15 mins  -CC      Ice S/P Rx Yes  -CC         ELECTRICAL STIMULATION    Attended/Unattended Unattended  -CC      Stimulation Type IFC  -CC      Location/Electrode Placement/Other L lumbar spine with CP-pt postioned as above  -CC        User Key  (r) = Recorded By, (t) = Taken By, (c) = Cosigned By    Initials Name Provider Type    CC Cadence Barron, PT Physical Therapist                Exercises     Row Name 05/25/18 1400             Subjective Comments    Subjective Comments Pt states she feels she is doing better each day  -CC         Subjective Pain    Able to rate subjective pain? yes  -CC      Pre-Treatment Pain Level 1  -CC      Post-Treatment Pain Level --   less than at onset of session  -CC         Exercise 1    Exercise Name 1 PPT  -CC      Cueing 1 Verbal;Tactile  -CC      Reps 1 10  -CC      Time 1 10 sec  -CC         Exercise 2    Exercise Name 2 B SKC   -CC      Cueing 2 Verbal;Tactile  -CC      Reps 2 5   use of sheet  -CC      Time 2 10 sec  -CC         Exercise 3    Exercise Name 3 B LTR  -CC      Cueing 3 Verbal;Tactile  -CC      Reps 3 5 sec  -CC      Time 3 10sec  -CC         Exercise 4    Exercise Name 4 L piriformis stretch with sheet  -CC      Cueing 4 Verbal;Tactile  -CC      Reps 4 2  -CC      Time 4 20 sec  -CC         Exercise 5    Exercise Name 5 Hamstring stretch 45 degrees with gentle osscilations  -CC      Cueing 5 Verbal;Tactile  -CC      Reps 5 5   -CC      Time 5  10 sec  -CC         Exercise 6    Exercise Name 6 Hooklying with PPT vs ADD  -CC      Cueing 6 Verbal;Tactile  -CC      Reps 6 10 with ball  -CC      Time 6 5 sec  -CC         Exercise 7    Exercise Name 7 Hooklying with PPT vs ABD vs  -CC      Cueing 7 Verbal;Tactile  -CC      Reps 7 15 with blue TB  -CC      Time 7 10   -CC         Exercise 8    Exercise Name 8 bridges  -CC      Cueing 8 Verbal  -CC      Reps 8 10  -CC      Time 8 5 sec  -CC      Additional Comments start HEP combined bridges and hooklying ABD vs blue TB  -CC         Exercise 9    Exercise Name 9 Lower AB marches with PPT and UE raise  -CC      Cueing 9 Verbal;Tactile  -CC      Time 9 10 ea   use of 2# wts in UE  -CC         Exercise 10    Exercise Name 10 PLR with alternate UE raise  -CC      Cueing 10 Verbal;Tactile  -CC      Reps 10 x 10 each -use of 2 pillows  -CC         Exercise 11    Exercise Name 11 Quadruped UE raise  -CC      Cueing 11 Verbal;Tactile  -CC      Reps 11 x10 each add 1# UE  -CC         Exercise 12    Exercise Name 12 B extension vs TB  -CC      Cueing 12 Verbal;Demo  -CC      Reps 12 x 10 -use of blue TB  -CC        User Key  (r) = Recorded By, (t) = Taken By, (c) = Cosigned By    Initials Name Provider Type    CHAU Barron, PT Physical Therapist                               PT OP Goals     Row Name 05/25/18 1400          PT Short Term Goals    STG Date to Achieve 06/08/18  -CC     STG 1 Pt performs HEP on daily basis  -CC     STG 1 Progress Met  -CC     STG 2 Resolve neural tension L hamstring and increase by 5-10 degrees  -CC     STG 2 Progress Met  -CC     STG 2 Progress Comments Increased to 80 degrees -negative sx  -CC     STG 3 Observe improved sitting in neutral postion vs lateral bending to R  -CC     STG 3 Progress Met  -CC     STG 3 Progress Comments Pt able to tolerate sitting in more equally on L buttock  -CC     STG 4 Pt progress to advance lumbar stabilization exercises and performs 10-20 reps   -CC     STG 4 Progress Met  -CC     STG 5 Pt reports can vacuuming 15-20 min at home without increase sx  -CC     STG 5 Progress Met  -CC       User Key  (r) = Recorded By, (t) = Taken By, (c) = Cosigned By    Initials Name Provider Type    CC Cadence Barron, PT Physical Therapist          Therapy Education  Education Details: Discussed how to progress lumbar stabilization for HEP.  Given: HEP  Program: Reinforced  How Provided: Verbal  Provided to: Patient  Level of Understanding: Verbalized              Time Calculation:   Start Time: 1400  Stop Time: 1500  Time Calculation (min): 60 min    Therapy Charges for Today     Code Description Service Date Service Provider Modifiers Qty    01249383168 HC PT THER PROC EA 15 MIN 5/25/2018 Cadence Barron, PT GP 2    38801614878 HC PT ELECTRICAL STIM UNATTENDED 5/25/2018 Cadence Barron, PT  1    63803999596 HC PT HOT OR COLD PACK TREAT MCARE 5/25/2018 Cadence Barron, PT GP 1                    Cadence Barron, PT  5/25/2018

## 2018-07-30 ENCOUNTER — DOCUMENTATION (OUTPATIENT)
Dept: PHYSICAL THERAPY | Facility: HOSPITAL | Age: 58
End: 2018-07-30

## 2018-07-30 DIAGNOSIS — Z98.890 S/P LUMBAR DISCECTOMY: Primary | ICD-10-CM

## 2018-11-29 ENCOUNTER — TRANSCRIBE ORDERS (OUTPATIENT)
Dept: ADMINISTRATIVE | Facility: HOSPITAL | Age: 58
End: 2018-11-29

## 2018-11-29 DIAGNOSIS — Z12.39 SCREENING BREAST EXAMINATION: Primary | ICD-10-CM

## 2018-12-19 ENCOUNTER — APPOINTMENT (OUTPATIENT)
Dept: MAMMOGRAPHY | Facility: HOSPITAL | Age: 58
End: 2018-12-19
Attending: FAMILY MEDICINE

## 2019-01-25 ENCOUNTER — HOSPITAL ENCOUNTER (OUTPATIENT)
Dept: MAMMOGRAPHY | Facility: HOSPITAL | Age: 59
Discharge: HOME | End: 2019-01-25
Attending: FAMILY MEDICINE | Admitting: FAMILY MEDICINE

## 2019-01-25 DIAGNOSIS — Z12.39 SCREENING BREAST EXAMINATION: ICD-10-CM

## 2019-01-25 PROCEDURE — 77063 BREAST TOMOSYNTHESIS BI: CPT

## 2019-01-25 PROCEDURE — 77067 SCR MAMMO BI INCL CAD: CPT

## 2019-12-27 ENCOUNTER — TRANSCRIBE ORDERS (OUTPATIENT)
Dept: ADMINISTRATIVE | Facility: HOSPITAL | Age: 59
End: 2019-12-27

## 2019-12-27 DIAGNOSIS — Z12.31 VISIT FOR SCREENING MAMMOGRAM: Primary | ICD-10-CM

## 2020-01-06 ENCOUNTER — OFFICE VISIT (OUTPATIENT)
Dept: OBSTETRICS AND GYNECOLOGY | Facility: CLINIC | Age: 60
End: 2020-01-06

## 2020-01-06 VITALS — WEIGHT: 156.8 LBS | DIASTOLIC BLOOD PRESSURE: 70 MMHG | SYSTOLIC BLOOD PRESSURE: 120 MMHG | BODY MASS INDEX: 27.78 KG/M2

## 2020-01-06 DIAGNOSIS — Z13.9 SCREENING FOR CONDITION: ICD-10-CM

## 2020-01-06 DIAGNOSIS — Z01.419 ROUTINE GYNECOLOGICAL EXAMINATION: ICD-10-CM

## 2020-01-06 DIAGNOSIS — Z01.419 PAP SMEAR, LOW-RISK: Primary | ICD-10-CM

## 2020-01-06 LAB
BILIRUB BLD-MCNC: NEGATIVE MG/DL
CLARITY, POC: CLEAR
COLOR UR: YELLOW
GLUCOSE UR STRIP-MCNC: NEGATIVE MG/DL
KETONES UR QL: NEGATIVE
LEUKOCYTE EST, POC: NEGATIVE
NITRITE UR-MCNC: NEGATIVE MG/ML
PH UR: 6 [PH] (ref 5–8)
PROT UR STRIP-MCNC: NEGATIVE MG/DL
RBC # UR STRIP: NEGATIVE /UL
SP GR UR: 1.03 (ref 1–1.03)
UROBILINOGEN UR QL: NORMAL

## 2020-01-06 PROCEDURE — 81002 URINALYSIS NONAUTO W/O SCOPE: CPT | Performed by: OBSTETRICS & GYNECOLOGY

## 2020-01-06 PROCEDURE — 99396 PREV VISIT EST AGE 40-64: CPT | Performed by: OBSTETRICS & GYNECOLOGY

## 2020-01-06 RX ORDER — IBUPROFEN 400 MG/1
400 TABLET ORAL
COMMUNITY
End: 2020-01-06

## 2020-01-06 RX ORDER — MELOXICAM 7.5 MG/1
7.5 TABLET ORAL DAILY
COMMUNITY
Start: 2018-01-13 | End: 2020-01-06

## 2020-01-06 RX ORDER — METFORMIN HYDROCHLORIDE 750 MG/1
750 TABLET, EXTENDED RELEASE ORAL DAILY
COMMUNITY
Start: 2019-12-10

## 2020-01-06 RX ORDER — LEVOTHYROXINE SODIUM 0.07 MG/1
TABLET ORAL DAILY
COMMUNITY
Start: 2019-12-05

## 2020-01-06 NOTE — PROGRESS NOTES
GYN Annual Exam     CC- Here for annual exam.     Cate Rosario is a 59 y.o. female established patient who presents for annual well woman exam. No  VB. Had back surgery for herniated discs and feels better.    OB History        1    Para   1    Term   1            AB        Living   1       SAB        TAB        Ectopic        Molar        Multiple        Live Births              Obstetric Comments   1 CS             Menarche:15  Menopause:49, ablation  HRT: took HRT for 2 years, none now  Current contraception: tubal ligation  History of abnormal Pap smear: no  History of abnormal mammogram: no  Family history of uterine, colon or ovarian cancer: MGM colon age 90  Family history of breast cancer: no  STD's: none  Lats pap: 2017- normal pap/HPV  JIN: none    Health Maintenance   Topic Date Due   • ANNUAL PHYSICAL  10/31/1965   • TDAP/TD VACCINES (1 - Tdap) 10/31/1973   • ZOSTER VACCINE (1 of 2) 10/31/2012   • HEPATITIS C SCREENING  2018   • PAP SMEAR  2018   • LIPID PANEL  2018   • Annual Gynecologic Pelvic and Breast Exam  2019   • INFLUENZA VACCINE  2019   • MAMMOGRAM  2021   • COLONOSCOPY  2026       Past Medical History:   Diagnosis Date   • Arthritis     cervical   • Cancer (CMS/HCC)     skin   • Heart murmur    • History of diverticulosis    • Hyperlipidemia    • Hypoglycemia     occ   • Hypothyroidism 2018   • Post-menopausal    • Sciatic nerve pain    • Thyroid disease        Past Surgical History:   Procedure Laterality Date   •  SECTION     • COLONOSCOPY N/A 2016    Procedure: COLONOSCOPY;  Surgeon: Juliane Chu MD;  Location: [facility];  Service:    • DILATATION AND CURETTAGE     • ENDOMETRIAL ABLATION     • LASER ABLATION     • LASIK     • LUMBAR LAMINECTOMY DISCECTOMY DECOMPRESSION Left 2018    Procedure: left L2-3, L5-S1 discectomies;  Surgeon: Rylan Castaneda MD;  Location: [facility] OR;  Service:  Neurosurgery   • TUBAL ABDOMINAL LIGATION           Current Outpatient Medications:   •  aspirin 81 MG tablet, Take 81 mg by mouth Daily. Stopped 4/1/18, Disp: , Rfl:   •  atorvastatin (LIPITOR) 20 MG tablet, Take 20 mg by mouth Every Morning., Disp: , Rfl:   •  levothyroxine (SYNTHROID, LEVOTHROID) 75 MCG tablet, Take  by mouth Daily., Disp: , Rfl:   •  metFORMIN ER (GLUCOPHAGE-XR) 750 MG 24 hr tablet, Take 750 mg by mouth Daily., Disp: , Rfl:   •  vitamin D (ERGOCALCIFEROL) 13024 units capsule capsule, 50,000 Units Every 7 (Seven) Days., Disp: , Rfl: 0    No Known Allergies    Social History     Tobacco Use   • Smoking status: Never Smoker   • Smokeless tobacco: Never Used   Substance Use Topics   • Alcohol use: No     Comment: occ/social   • Drug use: No       Family History   Problem Relation Age of Onset   • Thyroid disease Mother    • Heart disease Father    • Colon cancer Maternal Grandmother    • Breast cancer Neg Hx    • Ovarian cancer Neg Hx    • Malig Hyperthermia Neg Hx    • Pulmonary embolism Neg Hx    • Deep vein thrombosis Neg Hx        Review of Systems   Constitutional: Negative for appetite change, fatigue, fever and unexpected weight change.   Respiratory: Negative for cough and shortness of breath.    Cardiovascular: Negative for chest pain and palpitations.   Gastrointestinal: Negative for abdominal distention, abdominal pain, constipation, diarrhea and nausea.   Endocrine: Positive for heat intolerance (mild).   Genitourinary: Negative for dyspareunia, dysuria, menstrual problem, pelvic pain and vaginal discharge.   Musculoskeletal: Negative for back pain.   Skin: Negative for color change and rash.   Neurological: Negative for headaches.   Psychiatric/Behavioral: Negative for dysphoric mood. The patient is not nervous/anxious.    All other systems reviewed and are negative.      /70   Wt 71.1 kg (156 lb 12.8 oz)   LMP  (LMP Unknown)   BMI 27.78 kg/m²     Physical Exam    Constitutional: She is oriented to person, place, and time. She appears well-developed and well-nourished.   HENT:   Head: Normocephalic and atraumatic.   Eyes: Conjunctivae are normal. No scleral icterus.   Neck: Neck supple. No thyromegaly present.   Cardiovascular: Normal rate and regular rhythm.   Pulmonary/Chest: Effort normal and breath sounds normal. Right breast exhibits no inverted nipple, no mass, no nipple discharge, no skin change and no tenderness. Left breast exhibits no inverted nipple, no mass, no nipple discharge, no skin change and no tenderness.   Abdominal: Soft. Bowel sounds are normal. She exhibits no distension and no mass. There is no tenderness. There is no rebound and no guarding. No hernia.   Genitourinary: Uterus normal. Pelvic exam was performed with patient supine. There is no rash, tenderness or lesion on the right labia. There is no rash, tenderness or lesion on the left labia. Cervix exhibits no motion tenderness, no discharge and no friability. Right adnexum displays no mass, no tenderness and no fullness. Left adnexum displays no mass, no tenderness and no fullness. No erythema, tenderness or bleeding in the vagina. No foreign body in the vagina. No signs of injury around the vagina. No vaginal discharge found.   Neurological: She is oriented to person, place, and time.   Skin: Skin is warm and dry.   Psychiatric: She has a normal mood and affect. Her behavior is normal. Judgment and thought content normal.   Vitals reviewed.         Assessment/Plan    1) GYN HM: normal pap/HPV 1/2017.  Check pap/HPV    SBE demonstrated and encouraged.  2) STD screening: declines Condoms encouraged.  3) Bone health - Weight bearing exercise, dietary calcium recommendations and vitamin D reviewed.   4) Diet and Exercise discussed  5) Smoking Status: non smoker  6) Social: no issues  7)MMG:  UTD 1/25/19 Birads 1, schedule for later this month  8) DEXA- UTD with Dr Sánchez, normal per pt report  9)C  scope- UTD fall 2016, repeat 5 years  10)Parts of this document have been copied or forwarded from her previous visits and have been reviewed, updated and edited as indicated.   11) Follow up prn and 1 year       Cate was seen today for gynecologic exam.    Diagnoses and all orders for this visit:    Pap smear, low-risk  -     Pap IG, HPV-hr    Screening for condition  -     POC Urinalysis Dipstick    Routine gynecological examination  -     Pap IG, HPV-hr        Bety Irizarry MD  1/6/2020  11:00 AM

## 2020-01-08 LAB
CYTOLOGIST CVX/VAG CYTO: NORMAL
CYTOLOGY CVX/VAG DOC CYTO: NORMAL
CYTOLOGY CVX/VAG DOC THIN PREP: NORMAL
DX ICD CODE: NORMAL
HIV 1 & 2 AB SER-IMP: NORMAL
HPV I/H RISK 1 DNA CVX QL PROBE+SIG AMP: NEGATIVE
OTHER STN SPEC: NORMAL
STAT OF ADQ CVX/VAG CYTO-IMP: NORMAL

## 2020-01-27 ENCOUNTER — HOSPITAL ENCOUNTER (OUTPATIENT)
Dept: MAMMOGRAPHY | Facility: HOSPITAL | Age: 60
Discharge: HOME | End: 2020-01-27
Admitting: OBSTETRICS & GYNECOLOGY

## 2020-01-27 DIAGNOSIS — Z12.31 VISIT FOR SCREENING MAMMOGRAM: ICD-10-CM

## 2020-01-27 PROCEDURE — 77063 BREAST TOMOSYNTHESIS BI: CPT

## 2020-01-27 PROCEDURE — 77067 SCR MAMMO BI INCL CAD: CPT

## 2020-03-03 ENCOUNTER — TRANSCRIBE ORDERS (OUTPATIENT)
Dept: ADMINISTRATIVE | Facility: HOSPITAL | Age: 60
End: 2020-03-03

## 2020-03-03 DIAGNOSIS — Z78.0 MENOPAUSE: Primary | ICD-10-CM

## 2020-03-10 ENCOUNTER — APPOINTMENT (OUTPATIENT)
Dept: BONE DENSITY | Facility: HOSPITAL | Age: 60
End: 2020-03-10

## 2020-03-10 DIAGNOSIS — Z78.0 MENOPAUSE: ICD-10-CM

## 2020-03-10 PROCEDURE — 77080 DXA BONE DENSITY AXIAL: CPT

## 2021-01-11 ENCOUNTER — OFFICE VISIT (OUTPATIENT)
Dept: OBSTETRICS AND GYNECOLOGY | Facility: CLINIC | Age: 61
End: 2021-01-11

## 2021-01-11 VITALS
HEIGHT: 63 IN | WEIGHT: 152 LBS | DIASTOLIC BLOOD PRESSURE: 70 MMHG | SYSTOLIC BLOOD PRESSURE: 110 MMHG | BODY MASS INDEX: 26.93 KG/M2

## 2021-01-11 DIAGNOSIS — Z01.419 ROUTINE GYNECOLOGICAL EXAMINATION: Primary | ICD-10-CM

## 2021-01-11 DIAGNOSIS — Z13.9 SCREENING FOR CONDITION: ICD-10-CM

## 2021-01-11 DIAGNOSIS — M85.80 OSTEOPENIA, UNSPECIFIED LOCATION: ICD-10-CM

## 2021-01-11 PROCEDURE — 81002 URINALYSIS NONAUTO W/O SCOPE: CPT | Performed by: OBSTETRICS & GYNECOLOGY

## 2021-01-11 PROCEDURE — 99396 PREV VISIT EST AGE 40-64: CPT | Performed by: OBSTETRICS & GYNECOLOGY

## 2021-01-11 NOTE — PROGRESS NOTES
GYN Annual Exam     CC- Here for annual exam.     Cate Rosario is a 60 y.o. female established patient who presents for annual well woman exam. No  VB. Is not working during the pandemic. She had a bone growth on her chest wall that she said was imaged and found to arthritis. She has also had several basal cell cancers removed per derm.     OB History        1    Para   1    Term   1            AB        Living   1       SAB        TAB        Ectopic        Molar        Multiple        Live Births              Obstetric Comments   1 CS             Menarche:15  Menopause:49, ablation  HRT: took HRT for 2 years, none now  Current contraception: tubal ligation  History of abnormal Pap smear: no  History of abnormal mammogram: no  Family history of uterine, colon or ovarian cancer: MGM colon age 90  Family history of breast cancer: no  STD's: none  Lats pap: 2020- normal pap/HPV  JIN: none    Health Maintenance   Topic Date Due   • ANNUAL PHYSICAL  10/31/1965   • ZOSTER VACCINE (2 of 3) 2016   • HEPATITIS C SCREENING  2018   • LIPID PANEL  2018   • INFLUENZA VACCINE  2020   • Annual Gynecologic Pelvic and Breast Exam  2021   • MAMMOGRAM  2022   • PAP SMEAR  2023   • COLONOSCOPY  2026   • TDAP/TD VACCINES (2 - Td) 10/02/2027   • Pneumococcal Vaccine 0-64  Aged Out   • MENINGOCOCCAL VACCINE  Aged Out       Past Medical History:   Diagnosis Date   • Arthritis     cervical   • Cancer (CMS/HCC)     skin- basal cell   • Heart murmur    • History of diverticulosis    • Hyperlipidemia    • Hypoglycemia     occ   • Hypothyroidism 2018   • Post-menopausal    • Sciatic nerve pain    • Thyroid disease        Past Surgical History:   Procedure Laterality Date   •  SECTION     • COLONOSCOPY N/A 2016    Procedure: COLONOSCOPY;  Surgeon: Juliane Chu MD;  Location: [facility] OR;  Service:    • DILATATION AND CURETTAGE     • ENDOMETRIAL ABLATION   2008   • LASER ABLATION     • LASIK     • LUMBAR LAMINECTOMY DISCECTOMY DECOMPRESSION Left 4/9/2018    Procedure: left L2-3, L5-S1 discectomies;  Surgeon: Rylan Castaneda MD;  Location: [facility];  Service: Neurosurgery   • SKIN CANCER EXCISION     • TUBAL ABDOMINAL LIGATION           Current Outpatient Medications:   •  aspirin 81 MG tablet, Take 81 mg by mouth Daily. Stopped 4/1/18, Disp: , Rfl:   •  atorvastatin (LIPITOR) 20 MG tablet, Take 20 mg by mouth Every Morning., Disp: , Rfl:   •  levothyroxine (SYNTHROID, LEVOTHROID) 75 MCG tablet, Take  by mouth Daily., Disp: , Rfl:   •  metFORMIN ER (GLUCOPHAGE-XR) 750 MG 24 hr tablet, Take 750 mg by mouth Daily., Disp: , Rfl:   •  vitamin D (ERGOCALCIFEROL) 82663 units capsule capsule, 50,000 Units Every 7 (Seven) Days., Disp: , Rfl: 0    No Known Allergies    Social History     Tobacco Use   • Smoking status: Never Smoker   • Smokeless tobacco: Never Used   Substance Use Topics   • Alcohol use: No     Comment: occ/social   • Drug use: No       Family History   Problem Relation Age of Onset   • Thyroid disease Mother    • Heart disease Father    • Colon cancer Maternal Grandmother    • Breast cancer Neg Hx    • Ovarian cancer Neg Hx    • Malig Hyperthermia Neg Hx    • Pulmonary embolism Neg Hx    • Deep vein thrombosis Neg Hx        Review of Systems   Constitutional: Positive for activity change (pandemic). Negative for appetite change, fatigue, fever and unexpected weight change.   Respiratory: Negative for cough and shortness of breath.    Cardiovascular: Negative for chest pain and palpitations.   Gastrointestinal: Negative for abdominal distention, abdominal pain, constipation, diarrhea and nausea.   Endocrine: Positive for heat intolerance (mild).   Genitourinary: Negative for dyspareunia, dysuria, menstrual problem, pelvic pain, vaginal bleeding and vaginal discharge.   Musculoskeletal: Negative for back pain.   Skin: Negative for color change and rash.  "  Neurological: Negative for headaches.   Psychiatric/Behavioral: Negative for dysphoric mood. The patient is not nervous/anxious.    All other systems reviewed and are negative.      /70   Ht 160 cm (63\")   Wt 68.9 kg (152 lb)   LMP  (LMP Unknown)   Breastfeeding No   BMI 26.93 kg/m²     Physical Exam   Constitutional: She is oriented to person, place, and time. She appears well-developed.   HENT:   Head: Normocephalic and atraumatic.   Eyes: Conjunctivae are normal. No scleral icterus.   Neck: Neck supple. No thyromegaly present.   Cardiovascular: Normal rate, regular rhythm and normal heart sounds.   Pulmonary/Chest: Effort normal and breath sounds normal. Right breast exhibits no inverted nipple, no mass, no nipple discharge, no skin change and no tenderness. Left breast exhibits no inverted nipple, no mass, no nipple discharge, no skin change and no tenderness.       Abdominal: Soft. Normal appearance and bowel sounds are normal. She exhibits no distension and no mass. There is no abdominal tenderness. There is no rebound and no guarding. No hernia.   Genitourinary:    Pelvic exam was performed with patient supine.   There is no rash, tenderness, lesion or injury on the right labia. There is no rash, tenderness, lesion or injury on the left labia. Cervix exhibits no motion tenderness, no discharge and no friability. Right adnexum displays no mass, no tenderness and no fullness. Left adnexum displays no mass, no tenderness and no fullness.    No vaginal discharge, erythema, tenderness or bleeding.   No erythema, tenderness or bleeding in the vagina.    No foreign body in the vagina.      No signs of injury in the vagina.     Neurological: She is alert and oriented to person, place, and time.   Skin: Skin is warm and dry.   Psychiatric: Her behavior is normal. Mood, judgment and thought content normal.   Nursing note and vitals reviewed.         Assessment/Plan    1) GYN HM: normal pap/HPV 1/2020 SBE " demonstrated and encouraged.  2) STD screening: declines Condoms encouraged.  3) Bone health - Weight bearing exercise, dietary calcium recommendations and vitamin D reviewed.   4) Diet and Exercise discussed  5) Smoking Status: non smoker  6) Social: no issues  7)MMG:  UTD 1/2020, B1, schedule MMG 1//2021  8) DEXA- UTD  3/2020- osteopenia with a low risk of fracture ( 6.5 & 0.4 %). Enc calcium and vit D and repeat DEXA in 2-3 years  9)C scope- UTD 4/ 2016, repeat 10 years per op note  10)Parts of this document have been copied or forwarded from her previous visits and have been reviewed, updated and edited as indicated.   11) I saw the patient with a face mask, gloves and eye protection  The patient herself was masked.  Social distancing was observed as appropriate.  12)Follow up prn and 1 year       Diagnoses and all orders for this visit:    1. Routine gynecological examination (Primary)  -     POC Urinalysis Dipstick    2. Screening for condition  -     Mammo Screening Bilateral With CAD; Future    3. Osteopenia, unspecified location        Bety Irizarry MD  [id number]  15:04 EST

## 2021-01-16 ENCOUNTER — TRANSCRIBE ORDERS (OUTPATIENT)
Dept: ADMINISTRATIVE | Facility: HOSPITAL | Age: 61
End: 2021-01-16

## 2021-01-16 DIAGNOSIS — Z12.31 SCREENING MAMMOGRAM, ENCOUNTER FOR: Primary | ICD-10-CM

## 2021-01-28 ENCOUNTER — HOSPITAL ENCOUNTER (OUTPATIENT)
Dept: MAMMOGRAPHY | Facility: HOSPITAL | Age: 61
Discharge: HOME | End: 2021-01-28
Admitting: OBSTETRICS & GYNECOLOGY

## 2021-01-28 DIAGNOSIS — Z12.31 SCREENING MAMMOGRAM, ENCOUNTER FOR: ICD-10-CM

## 2021-01-28 PROCEDURE — 77063 BREAST TOMOSYNTHESIS BI: CPT

## 2021-01-28 PROCEDURE — 77067 SCR MAMMO BI INCL CAD: CPT

## 2021-02-10 ENCOUNTER — TRANSCRIBE ORDERS (OUTPATIENT)
Dept: ADMINISTRATIVE | Facility: HOSPITAL | Age: 61
End: 2021-02-10

## 2021-02-10 DIAGNOSIS — I20.8 ANGINA OF EFFORT (HCC): Primary | ICD-10-CM

## 2021-02-12 ENCOUNTER — TRANSCRIBE ORDERS (OUTPATIENT)
Dept: OBSTETRICS AND GYNECOLOGY | Facility: CLINIC | Age: 61
End: 2021-02-12

## 2021-02-12 DIAGNOSIS — Z01.818 OTHER SPECIFIED PRE-OPERATIVE EXAMINATION: Primary | ICD-10-CM

## 2021-02-15 ENCOUNTER — LAB (OUTPATIENT)
Dept: LAB | Facility: HOSPITAL | Age: 61
End: 2021-02-15

## 2021-02-15 DIAGNOSIS — Z01.818 OTHER SPECIFIED PRE-OPERATIVE EXAMINATION: ICD-10-CM

## 2021-02-15 LAB — SARS-COV-2 RNA PNL SPEC NAA+PROBE: NOT DETECTED

## 2021-02-15 PROCEDURE — C9803 HOPD COVID-19 SPEC COLLECT: HCPCS

## 2021-02-15 PROCEDURE — 87635 SARS-COV-2 COVID-19 AMP PRB: CPT | Performed by: OBSTETRICS & GYNECOLOGY

## 2021-02-17 ENCOUNTER — HOSPITAL ENCOUNTER (OUTPATIENT)
Dept: CARDIOLOGY | Facility: HOSPITAL | Age: 61
Discharge: HOME | End: 2021-02-17

## 2021-02-17 ENCOUNTER — HOSPITAL ENCOUNTER (OUTPATIENT)
Dept: NUCLEAR MEDICINE | Facility: HOSPITAL | Age: 61
Discharge: HOME | End: 2021-02-17

## 2021-02-17 DIAGNOSIS — I20.8 ANGINA OF EFFORT (HCC): ICD-10-CM

## 2021-02-17 LAB
BH CV NUCLEAR PRIOR STUDY: 3
BH CV STRESS BP STAGE 1: NORMAL
BH CV STRESS BP STAGE 2: NORMAL
BH CV STRESS BP STAGE 3: NORMAL
BH CV STRESS DURATION MIN STAGE 1: 3
BH CV STRESS DURATION MIN STAGE 2: 3
BH CV STRESS DURATION MIN STAGE 3: 1
BH CV STRESS DURATION SEC STAGE 1: 0
BH CV STRESS DURATION SEC STAGE 2: 0
BH CV STRESS DURATION SEC STAGE 3: 39
BH CV STRESS GRADE STAGE 1: 10
BH CV STRESS GRADE STAGE 2: 12
BH CV STRESS GRADE STAGE 3: 14
BH CV STRESS HR STAGE 1: 102
BH CV STRESS HR STAGE 2: 125
BH CV STRESS HR STAGE 3: 148
BH CV STRESS METS STAGE 1: 5
BH CV STRESS METS STAGE 2: 7.5
BH CV STRESS METS STAGE 3: 10
BH CV STRESS PROTOCOL 1: NORMAL
BH CV STRESS RECOVERY BP: NORMAL MMHG
BH CV STRESS RECOVERY HR: 88 BPM
BH CV STRESS SPEED STAGE 1: 1.7
BH CV STRESS SPEED STAGE 2: 2.5
BH CV STRESS SPEED STAGE 3: 3.4
BH CV STRESS STAGE 1: 1
BH CV STRESS STAGE 2: 2
BH CV STRESS STAGE 3: 3
LV EF NUC BP: 87 %
MAXIMAL PREDICTED HEART RATE: 162 BPM
PERCENT MAX PREDICTED HR: 91.36 %
STRESS BASELINE BP: NORMAL MMHG
STRESS BASELINE HR: 60 BPM
STRESS O2 SAT REST: 60 %
STRESS PERCENT HR: 107 %
STRESS POST ESTIMATED WORKLOAD: 9.6 METS
STRESS POST EXERCISE DUR MIN: 7 MIN
STRESS POST EXERCISE DUR SEC: 40 SEC
STRESS POST O2 SAT PEAK: 60 %
STRESS POST PEAK BP: NORMAL MMHG
STRESS POST PEAK HR: 148 BPM
STRESS TARGET HR: 138 BPM

## 2021-02-17 PROCEDURE — 93016 CV STRESS TEST SUPVJ ONLY: CPT | Performed by: INTERNAL MEDICINE

## 2021-02-17 PROCEDURE — 0 TECHNETIUM SESTAMIBI: Performed by: FAMILY MEDICINE

## 2021-02-17 PROCEDURE — 93018 CV STRESS TEST I&R ONLY: CPT | Performed by: INTERNAL MEDICINE

## 2021-02-17 PROCEDURE — A9500 TC99M SESTAMIBI: HCPCS | Performed by: FAMILY MEDICINE

## 2021-02-17 PROCEDURE — 78452 HT MUSCLE IMAGE SPECT MULT: CPT | Performed by: INTERNAL MEDICINE

## 2021-02-17 PROCEDURE — 93017 CV STRESS TEST TRACING ONLY: CPT

## 2021-02-17 PROCEDURE — 78452 HT MUSCLE IMAGE SPECT MULT: CPT

## 2021-02-17 RX ADMIN — TECHNETIUM TC 99M SESTAMIBI 1 DOSE: 1 INJECTION INTRAVENOUS at 07:31

## 2021-02-17 RX ADMIN — TECHNETIUM TC 99M SESTAMIBI 1 DOSE: 1 INJECTION INTRAVENOUS at 09:02

## 2021-02-25 ENCOUNTER — TRANSCRIBE ORDERS (OUTPATIENT)
Dept: ADMINISTRATIVE | Facility: HOSPITAL | Age: 61
End: 2021-02-25

## 2021-02-25 DIAGNOSIS — I49.3 PVC (PREMATURE VENTRICULAR CONTRACTION): Primary | ICD-10-CM

## 2021-03-01 ENCOUNTER — HOSPITAL ENCOUNTER (OUTPATIENT)
Dept: CARDIOLOGY | Facility: HOSPITAL | Age: 61
Discharge: HOME | End: 2021-03-01
Admitting: FAMILY MEDICINE

## 2021-03-01 DIAGNOSIS — I49.3 PVC (PREMATURE VENTRICULAR CONTRACTION): ICD-10-CM

## 2021-03-01 PROCEDURE — 93226 XTRNL ECG REC<48 HR SCAN A/R: CPT

## 2021-03-01 PROCEDURE — 93225 XTRNL ECG REC<48 HRS REC: CPT

## 2021-03-04 PROCEDURE — 93227 XTRNL ECG REC<48 HR R&I: CPT | Performed by: INTERNAL MEDICINE

## 2021-03-05 ENCOUNTER — TRANSCRIBE ORDERS (OUTPATIENT)
Dept: ADMINISTRATIVE | Facility: HOSPITAL | Age: 61
End: 2021-03-05

## 2021-03-05 DIAGNOSIS — R06.02 SHORTNESS OF BREATH ON EXERTION: Primary | ICD-10-CM

## 2021-03-09 ENCOUNTER — TRANSCRIBE ORDERS (OUTPATIENT)
Dept: ADMINISTRATIVE | Facility: HOSPITAL | Age: 61
End: 2021-03-09

## 2021-03-09 DIAGNOSIS — Z01.818 OTHER SPECIFIED PRE-OPERATIVE EXAMINATION: Primary | ICD-10-CM

## 2021-03-15 ENCOUNTER — LAB (OUTPATIENT)
Dept: LAB | Facility: HOSPITAL | Age: 61
End: 2021-03-15

## 2021-03-15 DIAGNOSIS — Z01.818 OTHER SPECIFIED PRE-OPERATIVE EXAMINATION: ICD-10-CM

## 2021-03-17 ENCOUNTER — APPOINTMENT (OUTPATIENT)
Dept: PULMONOLOGY | Facility: HOSPITAL | Age: 61
End: 2021-03-17

## 2021-03-29 ENCOUNTER — IMMUNIZATION (OUTPATIENT)
Dept: VACCINE CLINIC | Facility: HOSPITAL | Age: 61
End: 2021-03-29

## 2021-03-29 PROCEDURE — 0001A: CPT | Performed by: OBSTETRICS & GYNECOLOGY

## 2021-03-29 PROCEDURE — 91300 HC SARSCOV02 VAC 30MCG/0.3ML IM: CPT | Performed by: OBSTETRICS & GYNECOLOGY

## 2021-04-19 ENCOUNTER — IMMUNIZATION (OUTPATIENT)
Dept: VACCINE CLINIC | Facility: HOSPITAL | Age: 61
End: 2021-04-19

## 2021-04-19 PROCEDURE — 91300 HC SARSCOV02 VAC 30MCG/0.3ML IM: CPT | Performed by: OBSTETRICS & GYNECOLOGY

## 2021-04-19 PROCEDURE — 0002A: CPT | Performed by: OBSTETRICS & GYNECOLOGY

## 2021-10-20 ENCOUNTER — TRANSCRIBE ORDERS (OUTPATIENT)
Dept: ULTRASOUND IMAGING | Facility: HOSPITAL | Age: 61
End: 2021-10-20

## 2021-10-20 DIAGNOSIS — T14.8XXA HEMATOMA: Primary | ICD-10-CM

## 2021-11-11 ENCOUNTER — HOSPITAL ENCOUNTER (OUTPATIENT)
Dept: ULTRASOUND IMAGING | Facility: HOSPITAL | Age: 61
Discharge: HOME | End: 2021-11-11
Admitting: FAMILY MEDICINE

## 2021-11-11 DIAGNOSIS — T14.8XXA HEMATOMA: ICD-10-CM

## 2021-11-11 PROCEDURE — 76882 US LMTD JT/FCL EVL NVASC XTR: CPT

## 2022-01-26 ENCOUNTER — TRANSCRIBE ORDERS (OUTPATIENT)
Dept: ADMINISTRATIVE | Facility: HOSPITAL | Age: 62
End: 2022-01-26

## 2022-01-26 DIAGNOSIS — Z12.31 VISIT FOR SCREENING MAMMOGRAM: Primary | ICD-10-CM

## 2022-02-03 ENCOUNTER — HOSPITAL ENCOUNTER (OUTPATIENT)
Dept: MAMMOGRAPHY | Facility: HOSPITAL | Age: 62
Discharge: HOME | End: 2022-02-03
Admitting: OBSTETRICS & GYNECOLOGY

## 2022-02-03 DIAGNOSIS — Z12.31 VISIT FOR SCREENING MAMMOGRAM: ICD-10-CM

## 2022-02-03 PROCEDURE — 77063 BREAST TOMOSYNTHESIS BI: CPT

## 2022-02-03 PROCEDURE — 77067 SCR MAMMO BI INCL CAD: CPT

## 2022-05-23 ENCOUNTER — TRANSCRIBE ORDERS (OUTPATIENT)
Dept: BONE DENSITY | Facility: HOSPITAL | Age: 62
End: 2022-05-23

## 2022-05-23 DIAGNOSIS — Z78.0 MENOPAUSE: Primary | ICD-10-CM

## 2022-05-26 ENCOUNTER — APPOINTMENT (OUTPATIENT)
Dept: BONE DENSITY | Facility: HOSPITAL | Age: 62
End: 2022-05-26

## 2022-05-26 DIAGNOSIS — Z78.0 MENOPAUSE: ICD-10-CM

## 2022-05-26 PROCEDURE — 77080 DXA BONE DENSITY AXIAL: CPT

## 2022-07-11 ENCOUNTER — OFFICE VISIT (OUTPATIENT)
Dept: OBSTETRICS AND GYNECOLOGY | Facility: CLINIC | Age: 62
End: 2022-07-11

## 2022-07-11 VITALS
HEIGHT: 63 IN | BODY MASS INDEX: 26.93 KG/M2 | SYSTOLIC BLOOD PRESSURE: 124 MMHG | DIASTOLIC BLOOD PRESSURE: 72 MMHG | WEIGHT: 152 LBS

## 2022-07-11 DIAGNOSIS — Z01.419 PAP SMEAR, LOW-RISK: Primary | ICD-10-CM

## 2022-07-11 DIAGNOSIS — Z01.419 ROUTINE GYNECOLOGICAL EXAMINATION: ICD-10-CM

## 2022-07-11 DIAGNOSIS — Z11.51 SPECIAL SCREENING EXAMINATION FOR HUMAN PAPILLOMAVIRUS (HPV): ICD-10-CM

## 2022-07-11 DIAGNOSIS — Z12.31 ENCOUNTER FOR SCREENING MAMMOGRAM FOR MALIGNANT NEOPLASM OF BREAST: ICD-10-CM

## 2022-07-11 PROCEDURE — 81002 URINALYSIS NONAUTO W/O SCOPE: CPT | Performed by: OBSTETRICS & GYNECOLOGY

## 2022-07-11 PROCEDURE — 99396 PREV VISIT EST AGE 40-64: CPT | Performed by: OBSTETRICS & GYNECOLOGY

## 2022-07-11 NOTE — PROGRESS NOTES
GYN Annual Exam     CC- Here for annual exam.     Cate Rosario is a 61 y.o. female established patient who presents for annual well woman exam. No  VB. She is doing well, no new issues.     OB History        1    Para   1    Term   1            AB        Living   1       SAB        IAB        Ectopic        Molar        Multiple        Live Births              Obstetric Comments   1 CS             Menarche:15  Menopause:49, ablation  HRT: took HRT for 2 years, none now  Current contraception: tubal ligation  History of abnormal Pap smear: no  History of abnormal mammogram: no  Family history of uterine, colon or ovarian cancer: MGM colon age 90  Family history of breast cancer: no  STD's: none  Lats pap: 2020- normal pap/HPV  JIN: none    Health Maintenance   Topic Date Due   • ANNUAL PHYSICAL  Never done   • ZOSTER VACCINE (2 of 3) 2016   • HEPATITIS C SCREENING  Never done   • LIPID PANEL  Never done   • Annual Gynecologic Pelvic and Breast Exam  2022   • COVID-19 Vaccine (4 - Booster for Pfizer series) 2022   • INFLUENZA VACCINE  10/01/2022   • PAP SMEAR  2023   • MAMMOGRAM  2024   • COLORECTAL CANCER SCREENING  2026   • TDAP/TD VACCINES (2 - Td or Tdap) 10/02/2027   • Pneumococcal Vaccine 0-64  Aged Out       Past Medical History:   Diagnosis Date   • Arthritis     cervical   • Cancer (HCC)     skin- basal cell   • Heart murmur    • History of diverticulosis    • Hyperlipidemia    • Hypoglycemia     occ   • Hypothyroidism 2018   • Post-menopausal    • Sciatic nerve pain    • Thyroid disease        Past Surgical History:   Procedure Laterality Date   •  SECTION     • COLONOSCOPY N/A 2016    Procedure: COLONOSCOPY;  Surgeon: Juliane Chu MD;  Location: [facility];  Service:    • DILATATION AND CURETTAGE     • ENDOMETRIAL ABLATION     • LASER ABLATION     • LASIK     • LUMBAR LAMINECTOMY DISCECTOMY DECOMPRESSION Left 2018     Procedure: left L2-3, L5-S1 discectomies;  Surgeon: Rylan Castaneda MD;  Location[facility];  Service: Neurosurgery   • SKIN CANCER EXCISION     • TUBAL ABDOMINAL LIGATION           Current Outpatient Medications:   •  aspirin 81 MG tablet, Take 81 mg by mouth Daily. Stopped 4/1/18, Disp: , Rfl:   •  atorvastatin (LIPITOR) 20 MG tablet, Take 20 mg by mouth Every Morning., Disp: , Rfl:   •  levothyroxine (SYNTHROID, LEVOTHROID) 75 MCG tablet, Take  by mouth Daily., Disp: , Rfl:   •  vitamin D (ERGOCALCIFEROL) 58389 units capsule capsule, 50,000 Units Every 7 (Seven) Days., Disp: , Rfl: 0  •  metFORMIN ER (GLUCOPHAGE-XR) 750 MG 24 hr tablet, Take 750 mg by mouth Daily., Disp: , Rfl:     No Known Allergies    Social History     Tobacco Use   • Smoking status: Never Smoker   • Smokeless tobacco: Never Used   Vaping Use   • Vaping Use: Never used   Substance Use Topics   • Alcohol use: No     Comment: occ/social   • Drug use: No       Family History   Problem Relation Age of Onset   • Heart disease Father    • Thyroid disease Mother    • Colon cancer Maternal Grandmother    • Breast cancer Neg Hx    • Ovarian cancer Neg Hx    • Malig Hyperthermia Neg Hx    • Pulmonary embolism Neg Hx    • Deep vein thrombosis Neg Hx    • Uterine cancer Neg Hx        Review of Systems   Constitutional: Negative for activity change, appetite change, fatigue, fever and unexpected weight change.   Respiratory: Negative for cough and shortness of breath.    Cardiovascular: Negative for chest pain and palpitations.   Gastrointestinal: Negative for abdominal distention, abdominal pain, constipation, diarrhea and nausea.   Endocrine: Negative for cold intolerance and heat intolerance.   Genitourinary: Negative for dyspareunia, dysuria, menstrual problem, pelvic pain, vaginal bleeding and vaginal discharge.   Musculoskeletal: Negative for back pain.   Skin: Negative for color change and rash.   Neurological: Negative for headaches.  "  Psychiatric/Behavioral: Negative for dysphoric mood. The patient is not nervous/anxious.    All other systems reviewed and are negative.      /72   Ht 160 cm (63\")   Wt 68.9 kg (152 lb)   LMP  (LMP Unknown)   Breastfeeding No   BMI 26.93 kg/m²     Physical Exam   Constitutional: She is oriented to person, place, and time. She appears well-developed.   HENT:   Head: Normocephalic and atraumatic.   Eyes: Conjunctivae are normal. No scleral icterus.   Neck: No thyromegaly present.   Cardiovascular: Normal rate, regular rhythm and normal heart sounds.   Pulmonary/Chest: Effort normal and breath sounds normal. Right breast exhibits no inverted nipple, no mass, no nipple discharge, no skin change and no tenderness. Left breast exhibits no inverted nipple, no mass, no nipple discharge, no skin change and no tenderness.       Abdominal: Soft. Normal appearance and bowel sounds are normal. She exhibits no distension and no mass. There is no abdominal tenderness. There is no rebound and no guarding. No hernia.   Genitourinary:    Rectum normal.      Pelvic exam was performed with patient supine.   There is no rash, tenderness, lesion or injury on the right labia. There is no rash, tenderness, lesion or injury on the left labia. Uterus is not deviated, not enlarged, not fixed and not tender. Cervix exhibits no motion tenderness, no discharge and no friability. Right adnexum displays no mass, no tenderness and no fullness. Left adnexum displays no mass, no tenderness and no fullness.    No vaginal discharge, erythema, tenderness or bleeding.   No erythema, tenderness or bleeding in the vagina.    No foreign body in the vagina.      No signs of injury in the vagina.      Genitourinary Comments: Grade 1 cystocele noted     Neurological: She is alert and oriented to person, place, and time.   Skin: Skin is warm and dry.   Psychiatric: Her behavior is normal. Mood, judgment and thought content normal.   Nursing note " and vitals reviewed.         Assessment/Plan    1) GYN HM: normal pap/HPV 1/2020, check pap/HPV SBE demonstrated and encouraged.  2) STD screening: declines Condoms encouraged.  3) Bone health - Weight bearing exercise, dietary calcium recommendations and vitamin D reviewed.   4) Diet and Exercise discussed  5) Smoking Status: non smoker  6) Social: no issues  7)MMG:  UTD 2/2022, B1, schedule MMG 2/2022  8) DEXA- UTD  5/2021- previously with osteopenia, now normal.  Enc calcium and vit D and repeat DEXA in 2-3 years  9)C scope- UTD 4/ 2016, repeat 10 years per op note  10)Parts of this document have been copied or forwarded from her previous visits and have been reviewed, updated and edited as indicated.   11) I saw the patient with a face mask, gloves and eye protection  The patient herself was masked.  Social distancing was observed as appropriate.  12)Follow up prn and 1 year annual        Diagnoses and all orders for this visit:    1. Pap smear, low-risk (Primary)  -     POC Urinalysis Dipstick  -     IgP, Aptima HPV    2. Routine gynecological examination  -     POC Urinalysis Dipstick  -     IgP, Aptima HPV    3. Special screening examination for human papillomavirus (HPV)  -     POC Urinalysis Dipstick  -     IgP, Aptima HPV    4. Encounter for screening mammogram for malignant neoplasm of breast  -     Mammo Screening Bilateral With CAD; Future        Bety Irizarry MD  7/11/2022  14:41 EDT

## 2022-07-13 LAB
CYTOLOGIST CVX/VAG CYTO: NORMAL
CYTOLOGY CVX/VAG DOC CYTO: NORMAL
CYTOLOGY CVX/VAG DOC THIN PREP: NORMAL
DX ICD CODE: NORMAL
HIV 1 & 2 AB SER-IMP: NORMAL
HPV I/H RISK 4 DNA CVX QL PROBE+SIG AMP: NEGATIVE
OTHER STN SPEC: NORMAL
STAT OF ADQ CVX/VAG CYTO-IMP: NORMAL

## 2023-01-03 ENCOUNTER — TRANSCRIBE ORDERS (OUTPATIENT)
Dept: ADMINISTRATIVE | Facility: HOSPITAL | Age: 63
End: 2023-01-03
Payer: COMMERCIAL

## 2023-01-03 DIAGNOSIS — Z12.31 SCREENING MAMMOGRAM, ENCOUNTER FOR: Primary | ICD-10-CM

## 2023-02-06 ENCOUNTER — HOSPITAL ENCOUNTER (OUTPATIENT)
Dept: MAMMOGRAPHY | Facility: HOSPITAL | Age: 63
Discharge: HOME | End: 2023-02-06
Admitting: OBSTETRICS & GYNECOLOGY
Payer: COMMERCIAL

## 2023-02-06 DIAGNOSIS — Z12.31 SCREENING MAMMOGRAM, ENCOUNTER FOR: ICD-10-CM

## 2023-02-06 PROCEDURE — 77067 SCR MAMMO BI INCL CAD: CPT

## 2023-02-06 PROCEDURE — 77063 BREAST TOMOSYNTHESIS BI: CPT

## 2023-07-31 ENCOUNTER — OFFICE VISIT (OUTPATIENT)
Dept: OBSTETRICS AND GYNECOLOGY | Facility: CLINIC | Age: 63
End: 2023-07-31
Payer: COMMERCIAL

## 2023-07-31 VITALS
DIASTOLIC BLOOD PRESSURE: 80 MMHG | HEIGHT: 63 IN | WEIGHT: 149.2 LBS | SYSTOLIC BLOOD PRESSURE: 136 MMHG | BODY MASS INDEX: 26.44 KG/M2

## 2023-07-31 DIAGNOSIS — R10.9 ABDOMINAL CRAMPING: Primary | ICD-10-CM

## 2023-07-31 LAB
BILIRUB BLD-MCNC: NEGATIVE MG/DL
CLARITY, POC: CLEAR
COLOR UR: YELLOW
GLUCOSE UR STRIP-MCNC: NEGATIVE MG/DL
KETONES UR QL: NEGATIVE
LEUKOCYTE EST, POC: NEGATIVE
NITRITE UR-MCNC: NEGATIVE MG/ML
PH UR: 5 [PH] (ref 5–8)
PROT UR STRIP-MCNC: NEGATIVE MG/DL
RBC # UR STRIP: ABNORMAL /UL
SP GR UR: 1 (ref 1–1.03)
UROBILINOGEN UR QL: NORMAL

## 2023-07-31 PROCEDURE — 99213 OFFICE O/P EST LOW 20 MIN: CPT | Performed by: OBSTETRICS & GYNECOLOGY

## 2023-07-31 PROCEDURE — 81002 URINALYSIS NONAUTO W/O SCOPE: CPT | Performed by: OBSTETRICS & GYNECOLOGY

## 2024-02-07 ENCOUNTER — HOSPITAL ENCOUNTER (OUTPATIENT)
Dept: MAMMOGRAPHY | Facility: HOSPITAL | Age: 64
Discharge: HOME | End: 2024-02-07
Admitting: OBSTETRICS & GYNECOLOGY
Payer: COMMERCIAL

## 2024-02-07 DIAGNOSIS — Z12.31 ENCOUNTER FOR SCREENING MAMMOGRAM FOR MALIGNANT NEOPLASM OF BREAST: ICD-10-CM

## 2024-02-07 PROCEDURE — 77067 SCR MAMMO BI INCL CAD: CPT

## 2024-02-07 PROCEDURE — 77063 BREAST TOMOSYNTHESIS BI: CPT

## 2024-02-07 PROCEDURE — 77067 SCR MAMMO BI INCL CAD: CPT | Performed by: RADIOLOGY

## 2024-02-07 PROCEDURE — 77063 BREAST TOMOSYNTHESIS BI: CPT | Performed by: RADIOLOGY

## 2024-04-25 ENCOUNTER — APPOINTMENT (OUTPATIENT)
Dept: CT IMAGING | Facility: HOSPITAL | Age: 64
End: 2024-04-25
Payer: COMMERCIAL

## 2024-04-25 ENCOUNTER — HOSPITAL ENCOUNTER (EMERGENCY)
Facility: HOSPITAL | Age: 64
Discharge: HOME | End: 2024-04-25
Attending: EMERGENCY MEDICINE
Payer: COMMERCIAL

## 2024-04-25 VITALS
HEIGHT: 63 IN | BODY MASS INDEX: 27.46 KG/M2 | HEART RATE: 61 BPM | RESPIRATION RATE: 16 BRPM | SYSTOLIC BLOOD PRESSURE: 160 MMHG | DIASTOLIC BLOOD PRESSURE: 96 MMHG | WEIGHT: 155 LBS | TEMPERATURE: 97.7 F | OXYGEN SATURATION: 96 %

## 2024-04-25 DIAGNOSIS — R07.9 CHEST PAIN, UNSPECIFIED TYPE: ICD-10-CM

## 2024-04-25 DIAGNOSIS — I51.7 CARDIOMEGALY: Primary | ICD-10-CM

## 2024-04-25 DIAGNOSIS — I25.10 CORONARY ARTERY CALCIFICATION: ICD-10-CM

## 2024-04-25 DIAGNOSIS — I25.84 CORONARY ARTERY CALCIFICATION: ICD-10-CM

## 2024-04-25 LAB
ALBUMIN SERPL-MCNC: 4.7 G/DL (ref 3.5–5.2)
ALBUMIN/GLOB SERPL: 1.4 G/DL
ALP SERPL-CCNC: 124 U/L (ref 39–117)
ALT SERPL W P-5'-P-CCNC: 23 U/L (ref 1–33)
ANION GAP SERPL CALCULATED.3IONS-SCNC: 12.5 MMOL/L (ref 5–15)
AST SERPL-CCNC: 26 U/L (ref 1–32)
BASOPHILS # BLD AUTO: 0.02 10*3/MM3 (ref 0–0.2)
BASOPHILS NFR BLD AUTO: 0.4 % (ref 0–1.5)
BILIRUB SERPL-MCNC: 0.7 MG/DL (ref 0–1.2)
BUN SERPL-MCNC: 10 MG/DL (ref 8–23)
BUN/CREAT SERPL: 15.2 (ref 7–25)
CALCIUM SPEC-SCNC: 9.8 MG/DL (ref 8.6–10.5)
CHLORIDE SERPL-SCNC: 106 MMOL/L (ref 98–107)
CO2 SERPL-SCNC: 24.5 MMOL/L (ref 22–29)
CREAT SERPL-MCNC: 0.66 MG/DL (ref 0.57–1)
D DIMER PPP FEU-MCNC: <0.27 MCGFEU/ML (ref 0–0.61)
DEPRECATED RDW RBC AUTO: 41.6 FL (ref 37–54)
EGFRCR SERPLBLD CKD-EPI 2021: 99.9 ML/MIN/1.73
EOSINOPHIL # BLD AUTO: 0.09 10*3/MM3 (ref 0–0.4)
EOSINOPHIL NFR BLD AUTO: 2 % (ref 0.3–6.2)
ERYTHROCYTE [DISTWIDTH] IN BLOOD BY AUTOMATED COUNT: 12.6 % (ref 12.3–15.4)
GLOBULIN UR ELPH-MCNC: 3.3 GM/DL
GLUCOSE SERPL-MCNC: 95 MG/DL (ref 65–99)
HCT VFR BLD AUTO: 43.9 % (ref 34–46.6)
HGB BLD-MCNC: 13.8 G/DL (ref 12–15.9)
HOLD SPECIMEN: NORMAL
HOLD SPECIMEN: NORMAL
IMM GRANULOCYTES # BLD AUTO: 0 10*3/MM3 (ref 0–0.05)
IMM GRANULOCYTES NFR BLD AUTO: 0 % (ref 0–0.5)
INR PPP: 1 (ref 0.9–1.1)
LYMPHOCYTES # BLD AUTO: 1.65 10*3/MM3 (ref 0.7–3.1)
LYMPHOCYTES NFR BLD AUTO: 35.9 % (ref 19.6–45.3)
MAGNESIUM SERPL-MCNC: 2.3 MG/DL (ref 1.6–2.4)
MCH RBC QN AUTO: 27.9 PG (ref 26.6–33)
MCHC RBC AUTO-ENTMCNC: 31.4 G/DL (ref 31.5–35.7)
MCV RBC AUTO: 88.7 FL (ref 79–97)
MONOCYTES # BLD AUTO: 0.32 10*3/MM3 (ref 0.1–0.9)
MONOCYTES NFR BLD AUTO: 7 % (ref 5–12)
NEUTROPHILS NFR BLD AUTO: 2.52 10*3/MM3 (ref 1.7–7)
NEUTROPHILS NFR BLD AUTO: 54.7 % (ref 42.7–76)
NT-PROBNP SERPL-MCNC: 40 PG/ML (ref 0–900)
PHOSPHATE SERPL-MCNC: 3.5 MG/DL (ref 2.5–4.5)
PLATELET # BLD AUTO: 180 10*3/MM3 (ref 140–450)
PMV BLD AUTO: 10.2 FL (ref 6–12)
POTASSIUM SERPL-SCNC: 4.4 MMOL/L (ref 3.5–5.2)
PROT SERPL-MCNC: 8 G/DL (ref 6–8.5)
PROTHROMBIN TIME: 13.2 SECONDS (ref 12.1–15)
RBC # BLD AUTO: 4.95 10*6/MM3 (ref 3.77–5.28)
SODIUM SERPL-SCNC: 143 MMOL/L (ref 136–145)
T4 SERPL-MCNC: 9.16 MCG/DL (ref 4.5–11.7)
TROPONIN T SERPL HS-MCNC: <6 NG/L
TSH SERPL DL<=0.05 MIU/L-ACNC: 1.53 UIU/ML (ref 0.27–4.2)
WBC NRBC COR # BLD AUTO: 4.6 10*3/MM3 (ref 3.4–10.8)
WHOLE BLOOD HOLD COAG: NORMAL
WHOLE BLOOD HOLD SPECIMEN: NORMAL

## 2024-04-25 PROCEDURE — 84484 ASSAY OF TROPONIN QUANT: CPT | Performed by: NURSE PRACTITIONER

## 2024-04-25 PROCEDURE — 25510000001 IOPAMIDOL PER 1 ML: Performed by: EMERGENCY MEDICINE

## 2024-04-25 PROCEDURE — 99285 EMERGENCY DEPT VISIT HI MDM: CPT

## 2024-04-25 PROCEDURE — 71275 CT ANGIOGRAPHY CHEST: CPT

## 2024-04-25 PROCEDURE — 84100 ASSAY OF PHOSPHORUS: CPT | Performed by: NURSE PRACTITIONER

## 2024-04-25 PROCEDURE — 85610 PROTHROMBIN TIME: CPT | Performed by: NURSE PRACTITIONER

## 2024-04-25 PROCEDURE — 83735 ASSAY OF MAGNESIUM: CPT | Performed by: NURSE PRACTITIONER

## 2024-04-25 PROCEDURE — 93010 ELECTROCARDIOGRAM REPORT: CPT | Performed by: INTERNAL MEDICINE

## 2024-04-25 PROCEDURE — 80050 GENERAL HEALTH PANEL: CPT | Performed by: NURSE PRACTITIONER

## 2024-04-25 PROCEDURE — 84436 ASSAY OF TOTAL THYROXINE: CPT | Performed by: NURSE PRACTITIONER

## 2024-04-25 PROCEDURE — 93005 ELECTROCARDIOGRAM TRACING: CPT | Performed by: EMERGENCY MEDICINE

## 2024-04-25 PROCEDURE — 83880 ASSAY OF NATRIURETIC PEPTIDE: CPT | Performed by: NURSE PRACTITIONER

## 2024-04-25 PROCEDURE — 85379 FIBRIN DEGRADATION QUANT: CPT | Performed by: NURSE PRACTITIONER

## 2024-04-25 RX ORDER — SODIUM CHLORIDE 0.9 % (FLUSH) 0.9 %
10 SYRINGE (ML) INJECTION AS NEEDED
Status: DISCONTINUED | OUTPATIENT
Start: 2024-04-25 | End: 2024-04-25 | Stop reason: HOSPADM

## 2024-04-25 RX ADMIN — IOPAMIDOL 100 ML: 755 INJECTION, SOLUTION INTRAVENOUS at 13:18

## 2024-04-25 NOTE — DISCHARGE INSTRUCTIONS
Rest and increase fluids.  Continue your home medications.  Follow-up with your primary care.  Follow-up with cardiology to discuss enlarged heart and coronary artery calcifications.  Return to the emergency department symptoms get worse or change.

## 2024-04-25 NOTE — ED PROVIDER NOTES
Subjective   History of Present Illness   63-year-old  female patient presented to the emergency department via private vehicle with a chief complaint of back pain that radiates into the chest.  Patient states her symptoms started approximately 3 weeks ago and have progressively gotten worse.  She states last night she began having left upper arm pain.  She states the left upper arm pain was sharp in nature.  She states she went to her primary care and they directed her to come to the emergency department.  Patient states the back pain is located near the left shoulder blade and midline and it radiates through into the chest.  She states when the pain happens she does get significantly short of breath.  Patient denies any history of lung or cardiac issues.  Patient does have existing back problems and has had back surgery in the past.  Patient did have a stress test and Holter monitor done in 2021.  Stress test was equivocal and Holter monitor showed multifocal PVCs but no other abnormalities.    History provided by:  Medical records and patient      Review of Systems   Constitutional: Negative.    HENT: Negative.     Respiratory: Negative.     Cardiovascular:  Positive for chest pain. Negative for palpitations and leg swelling.   Gastrointestinal: Negative.    Genitourinary: Negative.    Musculoskeletal:  Positive for arthralgias and back pain. Negative for gait problem, joint swelling, myalgias, neck pain and neck stiffness.   Skin: Negative.    Neurological: Negative.    Psychiatric/Behavioral: Negative.     All other systems reviewed and are negative.      Past Medical History:   Diagnosis Date    Arthritis     cervical    Cancer     skin- basal cell    Heart murmur     History of diverticulosis     Hyperlipidemia     Hypoglycemia     occ    Hypothyroidism 01/25/2018    Post-menopausal     Sciatic nerve pain     Thyroid disease        Allergies   Allergen Reactions    Penicillins Unknown - High Severity        Past Surgical History:   Procedure Laterality Date     SECTION      COLONOSCOPY N/A 2016    Procedure: COLONOSCOPY;  Surgeon: Juliane Chu MD;  Location:  [facility];  Service:     DILATATION AND CURETTAGE      ENDOMETRIAL ABLATION  2008    LASER ABLATION      LASIK      LUMBAR LAMINECTOMY DISCECTOMY DECOMPRESSION Left 2018    Procedure: left L2-3, L5-S1 discectomies;  Surgeon: Rylan Castaneda MD;  Location: [facility] MAIN OR;  Service: Neurosurgery    SKIN CANCER EXCISION      TUBAL ABDOMINAL LIGATION         Family History   Problem Relation Age of Onset    Heart disease Father     Thyroid disease Mother     Colon cancer Maternal Grandmother     Breast cancer Neg Hx     Ovarian cancer Neg Hx     Malig Hyperthermia Neg Hx     Pulmonary embolism Neg Hx     Deep vein thrombosis Neg Hx     Uterine cancer Neg Hx        Social History     Socioeconomic History    Marital status:    Tobacco Use    Smoking status: Never    Smokeless tobacco: Never   Vaping Use    Vaping status: Never Used   Substance and Sexual Activity    Alcohol use: No     Comment: occ/social    Drug use: No    Sexual activity: Yes     Partners: Male     Birth control/protection: Post-menopausal, Tubal ligation     Comment: BTL           Objective   Physical Exam  Vitals and nursing note reviewed.   Constitutional:       Appearance: Normal appearance. She is normal weight.   HENT:      Head: Normocephalic and atraumatic.      Right Ear: External ear normal.      Left Ear: External ear normal.      Nose: Nose normal.      Mouth/Throat:      Mouth: Mucous membranes are moist.      Pharynx: Oropharynx is clear.   Eyes:      Extraocular Movements: Extraocular movements intact.      Conjunctiva/sclera: Conjunctivae normal.      Pupils: Pupils are equal, round, and reactive to light.   Cardiovascular:      Rate and Rhythm: Normal rate and regular rhythm.      Pulses: Normal pulses.      Heart sounds: Normal heart sounds. No  murmur heard.  Pulmonary:      Effort: Pulmonary effort is normal.      Breath sounds: Normal breath sounds.   Abdominal:      General: Bowel sounds are normal.      Palpations: Abdomen is soft.   Musculoskeletal:         General: Normal range of motion.      Cervical back: Normal range of motion and neck supple.   Skin:     General: Skin is warm and dry.      Capillary Refill: Capillary refill takes less than 2 seconds.   Neurological:      General: No focal deficit present.      Mental Status: She is alert and oriented to person, place, and time.   Psychiatric:         Mood and Affect: Mood normal.         Behavior: Behavior normal.         Thought Content: Thought content normal.         Judgment: Judgment normal.         Procedures           ED Course  ED Course as of 04/25/24 1409   Thu Apr 25, 2024   1213 ECG 12 Lead Chest Pain  EKG shows sinus rhythm with a ventricular rate of 60 bpm.  Normal P axis.  No QTc interval prolongation.  No other axis deviation.  IN interval 186.  EKG was reviewed by Dr. Melissa. [RC]      ED Course User Index  [RC] Gasper Cyr, JAMES                HEART Score: 2                              Medical Decision Making  Differential diagnosis includes myocardial infarction, angina, congestive heart failure, pneumonia, bronchitis, pulmonary embolism, aortic aneurysm, muscle spasms, thoracic radiculopathy, endocarditis, pleurisy, and costochondritis.    CT Angiogram Chest    Result Date: 4/25/2024  Impression: No pulmonary embolism or other acute abnormality in the chest. Four-chamber cardiomegaly. Electronically Signed: Vikas Morales MD  4/25/2024 1:39 PM EDT  Workstation ID: [id number]    Results for orders placed or performed during the hospital encounter of 04/25/24  -Gold Top - SST:        Result                      Value             Ref Range           Extra Tube                                                    Hold for add-ons.  -Green Top (Gel):        Result                       Value             Ref Range           Extra Tube                                                    Hold for add-ons.  -CBC Auto Differential:   Specimen: Blood       Result                      Value             Ref Range           WBC                         4.60              3.40 - 10.80*       RBC                         4.95              3.77 - 5.28 *       Hemoglobin                  13.8              12.0 - 15.9 *       Hematocrit                  43.9              34.0 - 46.6 %       MCV                         88.7              79.0 - 97.0 *       MCH                         27.9              26.6 - 33.0 *       MCHC                        31.4 (L)          31.5 - 35.7 *       RDW                         12.6              12.3 - 15.4 %       RDW-SD                      41.6              37.0 - 54.0 *       MPV                         10.2              6.0 - 12.0 fL       Platelets                   180               140 - 450 10*       Neutrophil %                54.7              42.7 - 76.0 %       Lymphocyte %                35.9              19.6 - 45.3 %       Monocyte %                  7.0               5.0 - 12.0 %        Eosinophil %                2.0               0.3 - 6.2 %         Basophil %                  0.4               0.0 - 1.5 %         Immature Grans %            0.0               0.0 - 0.5 %         Neutrophils, Absolute       2.52              1.70 - 7.00 *       Lymphocytes, Absolute       1.65              0.70 - 3.10 *       Monocytes, Absolute         0.32              0.10 - 0.90 *       Eosinophils, Absolute       0.09              0.00 - 0.40 *       Basophils, Absolute         0.02              0.00 - 0.20 *       Immature Grans, Absolu*     0.00              0.00 - 0.05 *  -Lavender Top:        Result                      Value             Ref Range           Extra Tube                                                    hold for add-on  -Light Blue Top:        Result                       Value             Ref Range           Extra Tube                                                    Hold for add-ons.  -High Sensitivity Troponin T:   Specimen: Blood       Result                      Value             Ref Range           HS Troponin T               <6                <14 ng/L       -Protime-INR:   Specimen: Blood       Result                      Value             Ref Range           Protime                     13.2              12.1 - 15.0 *       INR                         1.00              0.90 - 1.10    -D-dimer, Quantitative:   Specimen: Blood       Result                      Value             Ref Range           D-Dimer, Quantitative       <0.27             0.00 - 0.61 *  -TSH:   Specimen: Blood       Result                      Value             Ref Range           TSH                         1.530             0.270 - 4.20*  -Phosphorus:   Specimen: Blood       Result                      Value             Ref Range           Phosphorus                  3.5               2.5 - 4.5 mg*  -BNP:   Specimen: Blood       Result                      Value             Ref Range           proBNP                      40.0              0.0 - 900.0 *  -Magnesium:   Specimen: Blood       Result                      Value             Ref Range           Magnesium                   2.3               1.6 - 2.4 mg*  -Comprehensive Metabolic Panel:   Specimen: Blood       Result                      Value             Ref Range           Glucose                     95                65 - 99 mg/dL       BUN                         10                8 - 23 mg/dL        Creatinine                  0.66              0.57 - 1.00 *       Sodium                      143               136 - 145 mm*       Potassium                   4.4               3.5 - 5.2 mm*       Chloride                    106               98 - 107 mmo*       CO2                         24.5              22.0 - 29.0 *        Calcium                     9.8               8.6 - 10.5 m*       Total Protein               8.0               6.0 - 8.5 g/*       Albumin                     4.7               3.5 - 5.2 g/*       ALT (SGPT)                  23                1 - 33 U/L          AST (SGOT)                  26                1 - 32 U/L          Alkaline Phosphatase        124 (H)           39 - 117 U/L        Total Bilirubin             0.7               0.0 - 1.2 mg*       Globulin                    3.3               gm/dL               A/G Ratio                   1.4               g/dL                BUN/Creatinine Ratio        15.2              7.0 - 25.0          Anion Gap                   12.5              5.0 - 15.0 m*       eGFR                        99.9              >60.0 mL/min*  -ECG 12 Lead Chest Pain:        Result                      Value             Ref Range           QT Interval                 407               ms                  QTC Interval                406               ms             Results for orders placed or performed in visit on 07/31/23  -POC Urinalysis Dipstick:   Specimen: Urine       Result                      Value             Ref Range           Color                       Yellow            Yellow, Stra*       Clarity, UA                 Clear             Clear               Glucose, UA                 Negative          Negative mg/*       Bilirubin                   Negative          Negative            Ketones, UA                 Negative          Negative            Specific Gravity            1.005             1.005 - 1.030       Blood, UA                   Trace (A)         Negative            pH, Urine                   5.0               5.0 - 8.0           Protein, POC                Negative          Negative mg/*       Urobilinogen, UA            Normal            Normal, 0.2 *       Leukocytes                  Negative          Negative            Nitrite, UA                  Negative          Negative       Results for orders placed or performed in visit on 07/13/23  -POC Urinalysis Dipstick:   Specimen: Urine       Result                      Value             Ref Range           Color                       Yellow            Yellow, Stra*       Clarity, UA                 Clear             Clear               Glucose, UA                 Negative          Negative mg/*       Bilirubin                   Negative          Negative            Ketones, UA                 Negative          Negative            Specific Gravity            1.005             1.005 - 1.030       Blood, UA                   Negative          Negative            pH, Urine                   5.0               5.0 - 8.0           Protein, POC                Negative          Negative mg/*       Urobilinogen, UA            Normal            Normal, 0.2 *       Leukocytes                  Negative          Negative            Nitrite, UA                 Negative          Negative       Results for orders placed or performed in visit on 07/11/22  -IgP, Aptima HPV:   Specimen: ThinPrep Vial       Result                      Value             Ref Range           Diagnosis                   Comment                               Specimen adequacy:          Comment                               Clinician Provided ICD*     Comment                               Performed by:               Comment                               .                           .                                     Note:                       Comment                               Method:                     Comment                               HPV Aptima                  Negative          Negative       -POC Urinalysis Dipstick:   Specimen: Urine       Result                      Value             Ref Range           Color                       Yellow            Yellow, Stra*       Clarity, UA                 Clear             Clear                Glucose, UA                 Negative          Negative mg/*       Bilirubin                   Negative          Negative            Ketones, UA                 Negative          Negative            Specific Gravity            1.005             1.005 - 1.030       Blood, UA                   Negative          Negative            pH, Urine                   5.0               5.0 - 8.0           Protein, POC                Negative          Negative mg/*       Urobilinogen, UA            Normal            Normal              Leukocytes                  Negative          Negative            Nitrite, UA                 Negative          Negative         *Note: Due to a large number of results and/or encounters for the requested time period, some results have not been displayed. A complete set of results can be found in Results Review.     Discussed imaging, laboratory and assessment findings.  Patient has negative cardiac enzymes.  Patient on CT exam shows four-chamber cardiomegaly and coronary artery calcifications.  Since it has been over 3 years since her last stress test I do recommend patient has a stress test.  She may also need an echocardiogram.  I have encouraged her to follow-up with her primary care.  Patient also given the information for cardiology to call to schedule an appointment.  Patient encouraged to return the emergency department symptoms get worse or change.  Patient understands and agrees to discharge plan.    Problems Addressed:  Cardiomegaly: complicated acute illness or injury  Chest pain, unspecified type: complicated acute illness or injury  Coronary artery calcification: complicated acute illness or injury    Amount and/or Complexity of Data Reviewed  External Data Reviewed: labs, radiology, ECG and notes.  Labs: ordered. Decision-making details documented in ED Course.  Radiology: ordered. Decision-making details documented in ED Course.  ECG/medicine tests: ordered. Decision-making details  documented in ED Course.    Risk  Prescription drug management.        Final diagnoses:   Cardiomegaly   Coronary artery calcification   Chest pain, unspecified type       ED Disposition  ED Disposition       ED Disposition   Discharge    Condition   Stable    Comment   --               Juancho Toth MD  [address]  [address]  [place] [facility] [address]  [phone]    Schedule an appointment as soon as possible for a visit in 1 week      [facility] EMERGENCY DEPARTMENT  [address] [address] [phone]  Schedule an appointment as soon as possible for a visit       Ana Nelson MD  [address]  [facility] [address]  [phone]    Schedule an appointment as soon as possible for a visit   Call to schedule an appointment         Medication List      No changes were made to your prescriptions during this visit.            Gasper Cyr, APRN  04/25/24 1401

## 2024-04-26 LAB
QT INTERVAL: 407 MS
QTC INTERVAL: 406 MS

## 2024-05-02 ENCOUNTER — OFFICE VISIT (OUTPATIENT)
Dept: CARDIOLOGY | Facility: CLINIC | Age: 64
End: 2024-05-02
Payer: COMMERCIAL

## 2024-05-02 VITALS
HEART RATE: 65 BPM | HEIGHT: 63 IN | BODY MASS INDEX: 27.27 KG/M2 | SYSTOLIC BLOOD PRESSURE: 130 MMHG | DIASTOLIC BLOOD PRESSURE: 80 MMHG | WEIGHT: 153.9 LBS | OXYGEN SATURATION: 99 %

## 2024-05-02 DIAGNOSIS — R07.2 PRECORDIAL CHEST PAIN: ICD-10-CM

## 2024-05-02 DIAGNOSIS — I51.7 CARDIOMEGALY: Primary | ICD-10-CM

## 2024-05-02 PROCEDURE — 99204 OFFICE O/P NEW MOD 45 MIN: CPT | Performed by: INTERNAL MEDICINE

## 2024-05-02 RX ORDER — UBIDECARENONE 100 MG
100 CAPSULE ORAL DAILY
COMMUNITY

## 2024-05-02 NOTE — PROGRESS NOTES
PATIENTINFORMATION    Date of Office Visit: 2024  Encounter Provider: Keyon Stokes MD  Place of Service: [facility] CARDIOLOGY  Patient Name: Cate Rosario  : 1960    Subjective:     Encounter Date:2024      Patient ID: Cate Rosario is a 63 y.o. female.    No chief complaint on file.    HPI  Ms. Rosario is a pleasant 63 years old lady referred to cardiology clinic for further evaluation treatment of shortness of breath and intermittent chest pain.  She had to go to the ER on 2024 for evaluation of worsening exertional shortness of breath and intermittent retrosternal chest discomfort of several weeks.  Chest pain comes both during activities and rest and last for few minutes.  She is most concerned it about her breathing which is interfering with his daily chores and she gets tired and short winded.  She denies orthopnea, PND,presyncope syncope or extremity swelling.  She had her heart pounding in her ear on the day she went to the ER.  ER workup was unremarkable other than CT showing cardiomegaly.  ACS ruled out  Her father had heart attack in his 40s.  She has been on statin, aspirin and levothyroxine for years.  No prior known cardiovascular illness.    ROS  All systems reviewed and negative except as noted in HPI.    Past Medical History:   Diagnosis Date    Arthritis     cervical    Cancer     skin- basal cell    Heart murmur     History of diverticulosis     Hyperlipidemia     Hypoglycemia     occ    Hypothyroidism 2018    Post-menopausal     Sciatic nerve pain     Thyroid disease        Past Surgical History:   Procedure Laterality Date     SECTION      COLONOSCOPY N/A 2016    Procedure: COLONOSCOPY;  Surgeon: Juliane Chu MD;  Location: [facility];  Service:     DILATATION AND CURETTAGE  2008    ENDOMETRIAL ABLATION  2008    LASER ABLATION      LASIK      LUMBAR LAMINECTOMY DISCECTOMY DECOMPRESSION Left 2018    Procedure: left  "L2-3, L5-S1 discectomies;  Surgeon: Rylan Castaneda MD;  Location: [facility] OR;  Service: Neurosurgery    SKIN CANCER EXCISION      TUBAL ABDOMINAL LIGATION         Social History     Socioeconomic History    Marital status:    Tobacco Use    Smoking status: Never    Smokeless tobacco: Never   Vaping Use    Vaping status: Never Used   Substance and Sexual Activity    Alcohol use: No     Comment: occ/social    Drug use: No    Sexual activity: Yes     Partners: Male     Birth control/protection: Post-menopausal, Tubal ligation     Comment: BTL       Family History   Problem Relation Age of Onset    Heart disease Father     Thyroid disease Mother     Colon cancer Maternal Grandmother     Breast cancer Neg Hx     Ovarian cancer Neg Hx     Malig Hyperthermia Neg Hx     Pulmonary embolism Neg Hx     Deep vein thrombosis Neg Hx     Uterine cancer Neg Hx          Procedures       Objective:     /80 (BP Location: Left arm)   Pulse 65   Ht 160 cm (63\")   Wt 69.8 kg (153 lb 14.4 oz)   LMP  (LMP Unknown)   SpO2 99%   BMI 27.26 kg/m²  Body mass index is 27.26 kg/m².     Constitutional:       General: Not in acute distress.     Appearance: Well-developed. Not diaphoretic.   Eyes:      Pupils: Pupils are equal, round, and reactive to light.   HENT:      Head: Normocephalic and atraumatic.   Neck:      Thyroid: No thyromegaly.   Pulmonary:      Effort: Pulmonary effort is normal. No respiratory distress.      Breath sounds: Normal breath sounds. No wheezing. No rales.   Chest:      Chest wall: Not tender to palpatation.   Cardiovascular:      Normal rate. Regular rhythm.      No gallop.    Pulses:     Intact distal pulses.   Edema:     Peripheral edema absent.   Abdominal:      General: Bowel sounds are normal. There is no distension.      Palpations: Abdomen is soft.      Tenderness: There is no guarding.   Musculoskeletal: Normal range of motion.         General: No deformity.      Cervical back: Normal " range of motion and neck supple. Skin:     General: Skin is warm and dry.      Findings: No rash.   Neurological:      Mental Status: Alert and oriented to person, place, and time.      Cranial Nerves: No cranial nerve deficit.      Deep Tendon Reflexes: Reflexes are normal and symmetric.   Psychiatric:         Judgment: Judgment normal.       Review Of Data: I have reviewed pertinent recent labs, images and documents and pertinent findings included in HPI or assessment below.          Assessment/Plan:         Exertional shortness of breath and atypical chest pain  Cardiomegaly on CT scan  Hypercholesterolemia on atorvastatin  Levothyroxine on replacement therapy    Cardiovascular examination is unremarkable.  Blood pressure within range.  I will send her for stress echocardiogram to evaluate for CAD, and rule out structural heart disease.  Diagnosis and plan of care discussed with patient and verbalized understanding.            Your medication list            Accurate as of May 2, 2024  3:24 PM. If you have any questions, ask your nurse or doctor.                CONTINUE taking these medications        Instructions Last Dose Given Next Dose Due   aspirin 81 MG tablet      Take 1 tablet by mouth Daily. Stopped 4/1/18       atorvastatin 20 MG tablet  Commonly known as: LIPITOR      Take 1 tablet by mouth Every Morning. 3 times weekly.       coenzyme Q10 100 MG capsule      Take 1 capsule by mouth Daily.       levothyroxine 75 MCG tablet  Commonly known as: SYNTHROID, LEVOTHROID      Take  by mouth Daily.       vitamin D 1.25 MG (83840 UT) capsule capsule  Commonly known as: ERGOCALCIFEROL      1 capsule Every 7 (Seven) Days.                    Keyon Stokes MD  05/02/24  15:24 EDT

## 2024-05-20 ENCOUNTER — HOSPITAL ENCOUNTER (OUTPATIENT)
Dept: CARDIOLOGY | Facility: HOSPITAL | Age: 64
Discharge: HOME | End: 2024-05-20
Admitting: INTERNAL MEDICINE
Payer: COMMERCIAL

## 2024-05-20 VITALS
HEIGHT: 63 IN | DIASTOLIC BLOOD PRESSURE: 82 MMHG | WEIGHT: 153 LBS | SYSTOLIC BLOOD PRESSURE: 132 MMHG | BODY MASS INDEX: 27.11 KG/M2 | HEART RATE: 60 BPM

## 2024-05-20 DIAGNOSIS — R07.2 PRECORDIAL CHEST PAIN: ICD-10-CM

## 2024-05-20 LAB
AORTIC ARCH: 2.4 CM
ASCENDING AORTA: 2.6 CM
BH CV ECHO MEAS - ACS: 1.99 CM
BH CV ECHO MEAS - AO MAX PG: 6.9 MMHG
BH CV ECHO MEAS - AO MEAN PG: 3.6 MMHG
BH CV ECHO MEAS - AO ROOT DIAM: 3.4 CM
BH CV ECHO MEAS - AO V2 MAX: 131.1 CM/SEC
BH CV ECHO MEAS - AO V2 VTI: 28.6 CM
BH CV ECHO MEAS - AVA(I,D): 1.95 CM2
BH CV ECHO MEAS - EDV(CUBED): 86.5 ML
BH CV ECHO MEAS - EDV(MOD-SP2): 52 ML
BH CV ECHO MEAS - EDV(MOD-SP4): 68 ML
BH CV ECHO MEAS - EF(MOD-BP): 61 %
BH CV ECHO MEAS - EF(MOD-SP2): 88.5 %
BH CV ECHO MEAS - EF(MOD-SP4): 92.6 %
BH CV ECHO MEAS - ESV(CUBED): 17.3 ML
BH CV ECHO MEAS - ESV(MOD-SP2): 6 ML
BH CV ECHO MEAS - ESV(MOD-SP4): 5 ML
BH CV ECHO MEAS - FS: 41.5 %
BH CV ECHO MEAS - IVS/LVPW: 1.22 CM
BH CV ECHO MEAS - IVSD: 0.93 CM
BH CV ECHO MEAS - LAT PEAK E' VEL: 13.9 CM/SEC
BH CV ECHO MEAS - LV DIASTOLIC VOL/BSA (35-75): 39.4 CM2
BH CV ECHO MEAS - LV MASS(C)D: 118.9 GRAMS
BH CV ECHO MEAS - LV MAX PG: 3.7 MMHG
BH CV ECHO MEAS - LV MEAN PG: 1.72 MMHG
BH CV ECHO MEAS - LV SYSTOLIC VOL/BSA (12-30): 2.9 CM2
BH CV ECHO MEAS - LV V1 MAX: 95.6 CM/SEC
BH CV ECHO MEAS - LV V1 VTI: 20.5 CM
BH CV ECHO MEAS - LVIDD: 4.4 CM
BH CV ECHO MEAS - LVIDS: 2.6 CM
BH CV ECHO MEAS - LVOT AREA: 2.7 CM2
BH CV ECHO MEAS - LVOT DIAM: 1.86 CM
BH CV ECHO MEAS - LVPWD: 0.76 CM
BH CV ECHO MEAS - MED PEAK E' VEL: 8.6 CM/SEC
BH CV ECHO MEAS - MV A DUR: 0.14 SEC
BH CV ECHO MEAS - MV A MAX VEL: 60.9 CM/SEC
BH CV ECHO MEAS - MV DEC SLOPE: 462.8 CM/SEC2
BH CV ECHO MEAS - MV DEC TIME: 0.19 SEC
BH CV ECHO MEAS - MV E MAX VEL: 80.4 CM/SEC
BH CV ECHO MEAS - MV E/A: 1.32
BH CV ECHO MEAS - MV MAX PG: 3.8 MMHG
BH CV ECHO MEAS - MV MEAN PG: 1.5 MMHG
BH CV ECHO MEAS - MV P1/2T: 57.4 MSEC
BH CV ECHO MEAS - MV V2 VTI: 29.3 CM
BH CV ECHO MEAS - MVA(P1/2T): 3.8 CM2
BH CV ECHO MEAS - MVA(VTI): 1.9 CM2
BH CV ECHO MEAS - PA ACC TIME: 0.16 SEC
BH CV ECHO MEAS - PA V2 MAX: 88.3 CM/SEC
BH CV ECHO MEAS - PULM A REVS DUR: 0.11 SEC
BH CV ECHO MEAS - PULM A REVS VEL: 24.1 CM/SEC
BH CV ECHO MEAS - PULM DIAS VEL: 43.4 CM/SEC
BH CV ECHO MEAS - PULM S/D: 1.23
BH CV ECHO MEAS - PULM SYS VEL: 53.5 CM/SEC
BH CV ECHO MEAS - RV MAX PG: 2.7 MMHG
BH CV ECHO MEAS - RV V1 MAX: 81.8 CM/SEC
BH CV ECHO MEAS - RV V1 VTI: 20.2 CM
BH CV ECHO MEAS - SV(LVOT): 55.8 ML
BH CV ECHO MEAS - SV(MOD-SP2): 46 ML
BH CV ECHO MEAS - SV(MOD-SP4): 63 ML
BH CV ECHO MEAS - SVI(LVOT): 32.3 ML/M2
BH CV ECHO MEAS - SVI(MOD-SP2): 26.7 ML/M2
BH CV ECHO MEAS - SVI(MOD-SP4): 36.5 ML/M2
BH CV ECHO MEAS - TAPSE (>1.6): 1.84 CM
BH CV ECHO MEAS - TR MAX PG: 14.6 MMHG
BH CV ECHO MEAS - TR MAX VEL: 190.9 CM/SEC
BH CV ECHO MEASUREMENTS AVERAGE E/E' RATIO: 7.15
BH CV STRESS BP STAGE 1: NORMAL
BH CV STRESS BP STAGE 2: NORMAL
BH CV STRESS BP STAGE 3: NORMAL
BH CV STRESS DURATION MIN STAGE 1: 3
BH CV STRESS DURATION MIN STAGE 2: 3
BH CV STRESS DURATION MIN STAGE 3: 2
BH CV STRESS DURATION SEC STAGE 1: 0
BH CV STRESS DURATION SEC STAGE 2: 0
BH CV STRESS DURATION SEC STAGE 3: 35
BH CV STRESS ECHO POST STRESS EJECTION FRACTION EF: 90 %
BH CV STRESS GRADE STAGE 1: 10
BH CV STRESS GRADE STAGE 2: 12
BH CV STRESS GRADE STAGE 3: 14
BH CV STRESS HR STAGE 1: 99
BH CV STRESS HR STAGE 2: 121
BH CV STRESS HR STAGE 3: 140
BH CV STRESS METS STAGE 1: 4.6
BH CV STRESS METS STAGE 2: 7.1
BH CV STRESS METS STAGE 3: 10.2
BH CV STRESS PROTOCOL 1: NORMAL
BH CV STRESS RECOVERY BP: NORMAL MMHG
BH CV STRESS RECOVERY HR: 84 BPM
BH CV STRESS SPEED STAGE 1: 1.7
BH CV STRESS SPEED STAGE 2: 2.5
BH CV STRESS SPEED STAGE 3: 3.4
BH CV STRESS STAGE 1: 1
BH CV STRESS STAGE 2: 2
BH CV STRESS STAGE 3: 3
BH CV XLRA - RV BASE: 3.3 CM
BH CV XLRA - RV LENGTH: 6.1 CM
BH CV XLRA - RV MID: 2.6 CM
BH CV XLRA - TDI S': 10.6 CM/SEC
LEFT ATRIUM VOLUME INDEX: 14.7 ML/M2
MAXIMAL PREDICTED HEART RATE: 159 BPM
PERCENT MAX PREDICTED HR: 88.68 %
SINUS: 2.9 CM
STJ: 2.6 CM
STRESS BASELINE BP: NORMAL MMHG
STRESS BASELINE HR: 60 BPM
STRESS O2 SAT REST: 98 %
STRESS PERCENT HR: 104 %
STRESS POST ESTIMATED WORKLOAD: 10.2 METS
STRESS POST EXERCISE DUR MIN: 8 MIN
STRESS POST EXERCISE DUR SEC: 35 SEC
STRESS POST O2 SAT PEAK: 98 %
STRESS POST PEAK BP: NORMAL MMHG
STRESS POST PEAK HR: 141 BPM
STRESS TARGET HR: 135 BPM

## 2024-05-20 PROCEDURE — 93325 DOPPLER ECHO COLOR FLOW MAPG: CPT

## 2024-05-20 PROCEDURE — 93320 DOPPLER ECHO COMPLETE: CPT

## 2024-05-20 PROCEDURE — 25510000001 PERFLUTREN 6.52 MG/ML SUSPENSION: Performed by: INTERNAL MEDICINE

## 2024-05-20 PROCEDURE — 93350 STRESS TTE ONLY: CPT

## 2024-05-20 PROCEDURE — 93017 CV STRESS TEST TRACING ONLY: CPT

## 2024-05-20 RX ADMIN — PERFLUTREN 3 MG: 6.52 INJECTION, SUSPENSION INTRAVENOUS at 13:57

## 2024-05-24 NOTE — PROGRESS NOTES
Please notify Ms. Rosario at stress echo did not show evidence for significant blockages of heart arteries.  She did very well on the exercise.  She has mild valve disease that just needs Presley follow-up.  She has a normal heart function and size.  Let me know if she has any questions.  Follow-up in 1 year.  Thank you

## 2024-05-31 ENCOUNTER — TRANSCRIBE ORDERS (OUTPATIENT)
Dept: ADMINISTRATIVE | Facility: HOSPITAL | Age: 64
End: 2024-05-31
Payer: COMMERCIAL

## 2024-05-31 DIAGNOSIS — R06.00 DYSPNEA, UNSPECIFIED TYPE: Primary | ICD-10-CM

## 2024-06-10 ENCOUNTER — HOSPITAL ENCOUNTER (OUTPATIENT)
Dept: PULMONOLOGY | Facility: HOSPITAL | Age: 64
Discharge: HOME | End: 2024-06-10
Admitting: FAMILY MEDICINE
Payer: COMMERCIAL

## 2024-06-10 VITALS — RESPIRATION RATE: 16 BRPM | OXYGEN SATURATION: 99 % | HEART RATE: 99 BPM

## 2024-06-10 DIAGNOSIS — R06.00 DYSPNEA, UNSPECIFIED TYPE: ICD-10-CM

## 2024-06-10 LAB
BDY SITE: NORMAL
HGB BLDA-MCNC: 14.7 G/DL (ref 13.5–17.5)
Lab: NORMAL

## 2024-06-10 PROCEDURE — 94726 PLETHYSMOGRAPHY LUNG VOLUMES: CPT

## 2024-06-10 PROCEDURE — 94060 EVALUATION OF WHEEZING: CPT

## 2024-06-10 PROCEDURE — 94729 DIFFUSING CAPACITY: CPT

## 2024-06-10 PROCEDURE — 82820 HEMOGLOBIN-OXYGEN AFFINITY: CPT

## 2024-06-10 RX ORDER — ALBUTEROL SULFATE 2.5 MG/3ML
2.5 SOLUTION RESPIRATORY (INHALATION) ONCE
Status: COMPLETED | OUTPATIENT
Start: 2024-06-10 | End: 2024-06-10

## 2024-06-10 RX ADMIN — ALBUTEROL SULFATE 2.5 MG: 2.5 SOLUTION RESPIRATORY (INHALATION) at 08:42

## 2024-06-19 ENCOUNTER — TRANSCRIBE ORDERS (OUTPATIENT)
Dept: ADMINISTRATIVE | Facility: HOSPITAL | Age: 64
End: 2024-06-19
Payer: COMMERCIAL

## 2024-06-19 DIAGNOSIS — Z13.820 SPECIAL SCREENING FOR OSTEOPOROSIS: ICD-10-CM

## 2024-06-19 DIAGNOSIS — Z78.0 POSTMENOPAUSAL STATE: Primary | ICD-10-CM

## 2024-06-27 ENCOUNTER — APPOINTMENT (OUTPATIENT)
Dept: BONE DENSITY | Facility: HOSPITAL | Age: 64
End: 2024-06-27
Payer: COMMERCIAL

## 2024-06-27 DIAGNOSIS — Z78.0 POSTMENOPAUSAL STATE: ICD-10-CM

## 2024-06-27 DIAGNOSIS — Z13.820 SPECIAL SCREENING FOR OSTEOPOROSIS: ICD-10-CM

## 2024-06-27 PROCEDURE — 77080 DXA BONE DENSITY AXIAL: CPT

## 2024-07-22 ENCOUNTER — OFFICE VISIT (OUTPATIENT)
Dept: OBSTETRICS AND GYNECOLOGY | Facility: CLINIC | Age: 64
End: 2024-07-22
Payer: COMMERCIAL

## 2024-07-22 VITALS
HEIGHT: 63 IN | WEIGHT: 151 LBS | SYSTOLIC BLOOD PRESSURE: 124 MMHG | BODY MASS INDEX: 26.75 KG/M2 | DIASTOLIC BLOOD PRESSURE: 70 MMHG

## 2024-07-22 DIAGNOSIS — M85.80 OSTEOPENIA, SENILE: ICD-10-CM

## 2024-07-22 DIAGNOSIS — Z01.419 CERVICAL SMEAR, AS PART OF ROUTINE GYNECOLOGICAL EXAMINATION: Primary | ICD-10-CM

## 2024-07-22 DIAGNOSIS — Z11.51 SPECIAL SCREENING EXAMINATION FOR HUMAN PAPILLOMAVIRUS (HPV): ICD-10-CM

## 2024-07-22 DIAGNOSIS — Z12.31 ENCOUNTER FOR SCREENING MAMMOGRAM FOR MALIGNANT NEOPLASM OF BREAST: ICD-10-CM

## 2024-07-22 DIAGNOSIS — Z01.419 ROUTINE GYNECOLOGICAL EXAMINATION: ICD-10-CM

## 2024-07-22 PROCEDURE — 81002 URINALYSIS NONAUTO W/O SCOPE: CPT | Performed by: OBSTETRICS & GYNECOLOGY

## 2024-07-22 PROCEDURE — 99396 PREV VISIT EST AGE 40-64: CPT | Performed by: OBSTETRICS & GYNECOLOGY

## 2024-07-22 NOTE — PROGRESS NOTES
GYN Annual Exam     CC- Here for annual exam.     Cate Rosario is a 63 y.o. female established patient who presents for annual well woman exam. No  VB.She is having her first grandchild in November! ( Grandson)       OB History          1    Para   1    Term   1            AB        Living   1         SAB        IAB        Ectopic        Molar        Multiple        Live Births              Obstetric Comments   1 CS               Menarche:15  Menopause:49, ablation   HRT: took HRT for 2 years, none now  Current contraception: tubal ligation  History of abnormal Pap smear: no  History of abnormal mammogram: no  Family history of uterine, colon or ovarian cancer: MGM colon age 90  Family history of breast cancer: no  STD's: none  Lats pap: 2022- normal pap/HPV  JIN: none    Health Maintenance   Topic Date Due    LIPID PANEL  Never done    ZOSTER VACCINE (2 of 3) 2016    HEPATITIS C SCREENING  Never done    ANNUAL PHYSICAL  Never done    BMI FOLLOWUP  2019    COVID-19 Vaccine ( season) 2023    Annual Gynecologic Pelvic and Breast Exam  2024    INFLUENZA VACCINE  2024    PAP SMEAR  2025    MAMMOGRAM  2026    COLORECTAL CANCER SCREENING  2026    DXA SCAN  2026    TDAP/TD VACCINES (2 - Td or Tdap) 10/02/2027    Pneumococcal Vaccine 0-64  Aged Out       Past Medical History:   Diagnosis Date    Arthritis     cervical    Cancer     skin- basal cell    Heart murmur     History of diverticulosis     Hyperlipidemia     Hypoglycemia     occ    Hypothyroidism 2018    Post-menopausal     Sciatic nerve pain     Thyroid disease        Past Surgical History:   Procedure Laterality Date     SECTION      COLONOSCOPY N/A 2016    Procedure: COLONOSCOPY;  Surgeon: Juliane Chu MD;  Location: [facility];  Service:     DILATATION AND CURETTAGE      ENDOMETRIAL ABLATION      LASER ABLATION      LASIK      LUMBAR LAMINECTOMY  DISCECTOMY DECOMPRESSION Left 04/09/2018    Procedure: left L2-3, L5-S1 discectomies;  Surgeon: Rylan Castaneda MD;  Location: [facility];  Service: Neurosurgery    SKIN CANCER EXCISION      TUBAL ABDOMINAL LIGATION           Current Outpatient Medications:     aspirin 81 MG tablet, Take 1 tablet by mouth Daily. Stopped 4/1/18, Disp: , Rfl:     atorvastatin (LIPITOR) 20 MG tablet, Take 1 tablet by mouth Every Morning. 3 times weekly., Disp: , Rfl:     coenzyme Q10 100 MG capsule, Take 1 capsule by mouth Daily., Disp: , Rfl:     levothyroxine (SYNTHROID, LEVOTHROID) 75 MCG tablet, Take  by mouth Daily., Disp: , Rfl:     vitamin D (ERGOCALCIFEROL) 74261 units capsule capsule, 1 capsule Every 7 (Seven) Days., Disp: , Rfl: 0    Allergies   Allergen Reactions    Penicillins Unknown - High Severity       Social History     Tobacco Use    Smoking status: Never    Smokeless tobacco: Never   Vaping Use    Vaping status: Never Used   Substance Use Topics    Alcohol use: No     Comment: occ/social    Drug use: No       Family History   Problem Relation Age of Onset    Heart disease Father     Thyroid disease Mother     Colon cancer Maternal Grandmother     Breast cancer Neg Hx     Ovarian cancer Neg Hx     Malig Hyperthermia Neg Hx     Pulmonary embolism Neg Hx     Deep vein thrombosis Neg Hx     Uterine cancer Neg Hx        Review of Systems   Constitutional:  Negative for activity change, appetite change, fatigue, fever and unexpected weight change.   Respiratory:  Negative for cough and shortness of breath.    Cardiovascular:  Negative for chest pain and palpitations.   Gastrointestinal:  Negative for abdominal distention, abdominal pain, constipation, diarrhea and nausea.   Endocrine: Negative for cold intolerance and heat intolerance.   Genitourinary:  Negative for dyspareunia, dysuria, menstrual problem, pelvic pain, vaginal bleeding, vaginal discharge and vaginal pain.   Musculoskeletal:  Positive for arthralgias  "(R wrist pain). Negative for back pain.   Skin:  Negative for color change and rash.   Neurological:  Negative for headaches.   Psychiatric/Behavioral:  Negative for dysphoric mood. The patient is not nervous/anxious.    All other systems reviewed and are negative.      /70   Ht 160 cm (63\")   Wt 68.5 kg (151 lb)   LMP  (LMP Unknown)   Breastfeeding No   BMI 26.75 kg/m²     Physical Exam   Constitutional: She is oriented to person, place, and time. She appears well-developed.   HENT:   Head: Normocephalic and atraumatic.   Eyes: Conjunctivae are normal. No scleral icterus.   Neck: No thyromegaly present.   Cardiovascular: Normal rate, regular rhythm and normal heart sounds.   Pulmonary/Chest: Effort normal and breath sounds normal. Right breast exhibits no inverted nipple, no mass, no nipple discharge, no skin change and no tenderness. Left breast exhibits no inverted nipple, no mass, no nipple discharge, no skin change and no tenderness.       Abdominal: Soft. Normal appearance and bowel sounds are normal. She exhibits no distension and no mass. There is no abdominal tenderness. There is no rebound and no guarding. No hernia.   Genitourinary:    Rectum normal.      Pelvic exam was performed with patient supine.   There is no rash, tenderness, lesion or injury on the right labia. There is no rash, tenderness, lesion or injury on the left labia. Uterus is not deviated, not enlarged, not fixed and not tender. Cervix exhibits no motion tenderness, no discharge and no friability. Right adnexum displays no mass, no tenderness and no fullness. Left adnexum displays no mass, no tenderness and no fullness.    No vaginal discharge, erythema, tenderness or bleeding.   No erythema, tenderness or bleeding in the vagina.    No foreign body in the vagina.      No signs of injury in the vagina.      Genitourinary Comments: Grade 1 cystocele noted       Neurological: She is alert and oriented to person, place, and time. "   Skin: Skin is warm and dry.   Psychiatric: Her behavior is normal. Mood, judgment and thought content normal.   Nursing note and vitals reviewed.         Assessment/Plan    1) GYN HM: normal pap/HPV 7/2022, check pap/HPV. SBE demonstrated and encouraged.  2) STD screening: declines Condoms encouraged.  3) Bone health - Weight bearing exercise, dietary calcium recommendations and vitamin D reviewed.   4) Diet and Exercise discussed  5) Smoking Status: non smoker  6) Social: no issues  7)MMG:  UTD 2/20224 B1, schedule MMG 2/2025  8) DEXA- UTD  6/2024- osteopenia LROF 14 & 1.2 %, repeat DEXA in 2-3 years  9)C scope- UTD 4/ 2016, repeat 10 years per op note  10)Parts of this document have been copied or forwarded from her previous visits and have been reviewed, updated and edited as indicated.   11) Follow up prn and 1 year annual            Diagnoses and all orders for this visit:    1. Cervical smear, as part of routine gynecological examination (Primary)  -     POC Urinalysis Dipstick  -     IGP, Apt HPV,rfx 16 / 18,45    2. Routine gynecological examination  -     POC Urinalysis Dipstick  -     IGP, Apt HPV,rfx 16 / 18,45    3. Special screening examination for human papillomavirus (HPV)  -     POC Urinalysis Dipstick  -     IGP, Apt HPV,rfx 16 / 18,45    4. Encounter for screening mammogram for malignant neoplasm of breast  -     Mammo Screening Digital Tomosynthesis Bilateral With CAD; Future    5. Osteopenia, senile        Bety Irizarry MD  7/22/2024  19:27 EDT

## 2024-07-26 LAB
CYTOLOGIST CVX/VAG CYTO: NORMAL
CYTOLOGY CVX/VAG DOC CYTO: NORMAL
CYTOLOGY CVX/VAG DOC THIN PREP: NORMAL
DX ICD CODE: NORMAL
HPV I/H RISK 4 DNA CVX QL PROBE+SIG AMP: NEGATIVE
Lab: NORMAL
Lab: NORMAL
OTHER STN SPEC: NORMAL
STAT OF ADQ CVX/VAG CYTO-IMP: NORMAL

## 2025-05-30 ENCOUNTER — HOSPITAL ENCOUNTER (OUTPATIENT)
Dept: MAMMOGRAPHY | Facility: HOSPITAL | Age: 65
Discharge: HOME | End: 2025-05-30
Admitting: OBSTETRICS & GYNECOLOGY
Payer: COMMERCIAL

## 2025-05-30 DIAGNOSIS — Z12.31 ENCOUNTER FOR SCREENING MAMMOGRAM FOR MALIGNANT NEOPLASM OF BREAST: ICD-10-CM

## 2025-05-30 PROCEDURE — 77067 SCR MAMMO BI INCL CAD: CPT

## 2025-05-30 PROCEDURE — 77063 BREAST TOMOSYNTHESIS BI: CPT | Performed by: RADIOLOGY

## 2025-05-30 PROCEDURE — 77063 BREAST TOMOSYNTHESIS BI: CPT

## 2025-05-30 PROCEDURE — 77067 SCR MAMMO BI INCL CAD: CPT | Performed by: RADIOLOGY

## 2025-07-24 ENCOUNTER — OFFICE VISIT (OUTPATIENT)
Dept: OBSTETRICS AND GYNECOLOGY | Facility: CLINIC | Age: 65
End: 2025-07-24
Payer: COMMERCIAL

## 2025-07-24 VITALS
WEIGHT: 149 LBS | BODY MASS INDEX: 26.4 KG/M2 | SYSTOLIC BLOOD PRESSURE: 118 MMHG | HEIGHT: 63 IN | DIASTOLIC BLOOD PRESSURE: 72 MMHG

## 2025-07-24 DIAGNOSIS — Z01.419 ROUTINE GYNECOLOGICAL EXAMINATION: Primary | ICD-10-CM

## 2025-07-24 DIAGNOSIS — Z12.11 SCREENING FOR COLON CANCER: ICD-10-CM

## 2025-07-24 DIAGNOSIS — Z12.31 ENCOUNTER FOR SCREENING MAMMOGRAM FOR MALIGNANT NEOPLASM OF BREAST: ICD-10-CM

## 2025-07-24 LAB
BILIRUB BLD-MCNC: NEGATIVE MG/DL
CLARITY, POC: CLEAR
COLOR UR: YELLOW
GLUCOSE UR STRIP-MCNC: NEGATIVE MG/DL
KETONES UR QL: NEGATIVE
LEUKOCYTE EST, POC: NEGATIVE
NITRITE UR-MCNC: NEGATIVE MG/ML
PH UR: 5 [PH] (ref 5–8)
PROT UR STRIP-MCNC: NEGATIVE MG/DL
RBC # UR STRIP: NEGATIVE /UL
SP GR UR: 1.03 (ref 1–1.03)
UROBILINOGEN UR QL: NORMAL

## 2025-07-24 NOTE — PROGRESS NOTES
GYN Annual Exam     CC- Here for annual exam.     Cate Rosario is a 64 y.o. female established patient who presents for annual well woman exam. No  VB.  Grandson Ahsan is 9 months old and she watches him every day.  She does have some mild osteopenia we discussed calcium and vitamin D.  She has some mild vaginal atrophy but declines estrogen cream for now.      OB History          1    Para   1    Term   1            AB        Living   1         SAB        IAB        Ectopic        Molar        Multiple        Live Births              Obstetric Comments   1 CS               Menarche:15  Menopause:49, ablation   HRT: took HRT for 2 years, none now  Current contraception: tubal ligation  History of abnormal Pap smear: no  History of abnormal mammogram: no  Family history of uterine, colon or ovarian cancer: MGM colon age 90  Family history of breast cancer: no  STD's: none  Lats pap: 2024- normal pap/HPV  JIN: none    Health Maintenance   Topic Date Due    Pneumococcal Vaccine 50+ (1 of 1 - PCV) Never done    ZOSTER VACCINE (2 of 3) 2016    HEPATITIS C SCREENING  Never done    ANNUAL PHYSICAL  Never done    COVID-19 Vaccine ( season) 2024    Annual Gynecologic Pelvic and Breast Exam  2025    INFLUENZA VACCINE  10/01/2025    COLORECTAL CANCER SCREENING  2026    MAMMOGRAM  2027    PAP SMEAR  2027    TDAP/TD VACCINES (3 - Td or Tdap) 10/25/2034       Past Medical History:   Diagnosis Date    Arthritis     cervical    Cancer     skin- basal cell    Coronary artery disease 26    Heart murmur     History of diverticulosis     Hyperlipidemia     Hypoglycemia     occ    Hypothyroidism 2018    Osteopenia     Post-menopausal     Sciatic nerve pain     Thyroid disease        Past Surgical History:   Procedure Laterality Date     SECTION      COLONOSCOPY N/A 2016    Procedure: COLONOSCOPY;  Surgeon: Juliane Chu MD;  Location: [facility]  OR;  Service:     DILATATION AND CURETTAGE  2008    ENDOMETRIAL ABLATION  2008    LASER ABLATION      LASIK      LUMBAR LAMINECTOMY DISCECTOMY DECOMPRESSION Left 04/09/2018    Procedure: left L2-3, L5-S1 discectomies;  Surgeon: Rylan Castaneda MD;  Location: [facility] OR;  Service: Neurosurgery    SKIN CANCER EXCISION      TUBAL ABDOMINAL LIGATION           Current Outpatient Medications:     aspirin 81 MG tablet, Take 1 tablet by mouth Daily. Stopped 4/1/18, Disp: , Rfl:     atorvastatin (LIPITOR) 20 MG tablet, Take 1 tablet by mouth Every Morning. 3 times weekly., Disp: , Rfl:     coenzyme Q10 100 MG capsule, Take 1 capsule by mouth Daily., Disp: , Rfl:     levothyroxine (SYNTHROID, LEVOTHROID) 75 MCG tablet, Take  by mouth Daily., Disp: , Rfl:     vitamin D (ERGOCALCIFEROL) 99540 units capsule capsule, 1 capsule Every 7 (Seven) Days., Disp: , Rfl: 0    Allergies   Allergen Reactions    Penicillins Unknown - High Severity       Social History     Tobacco Use    Smoking status: Never    Smokeless tobacco: Never   Vaping Use    Vaping status: Never Used   Substance Use Topics    Alcohol use: No     Comment: occ/social    Drug use: No       Family History   Problem Relation Age of Onset    Heart disease Father     Heart attack Father     Thyroid disease Mother     Colon cancer Maternal Grandmother     Breast cancer Neg Hx     Ovarian cancer Neg Hx     Malig Hyperthermia Neg Hx     Pulmonary embolism Neg Hx     Deep vein thrombosis Neg Hx     Uterine cancer Neg Hx        Review of Systems   Constitutional:  Positive for activity change. Negative for appetite change, fatigue, fever and unexpected weight change.   Respiratory:  Negative for cough and shortness of breath.    Cardiovascular:  Negative for chest pain and palpitations.   Gastrointestinal:  Negative for abdominal distention, abdominal pain, constipation, diarrhea and nausea.   Endocrine: Negative for cold intolerance and heat intolerance.   Genitourinary:  " Negative for dyspareunia, dysuria, menstrual problem, pelvic pain, vaginal bleeding, vaginal discharge and vaginal pain.   Musculoskeletal:  Negative for arthralgias and back pain.   Skin:  Negative for color change and rash.   Neurological:  Negative for headaches.   Psychiatric/Behavioral:  Negative for dysphoric mood. The patient is not nervous/anxious.    All other systems reviewed and are negative.      /72   Ht 160 cm (63\")   Wt 67.6 kg (149 lb)   LMP  (LMP Unknown)   BMI 26.39 kg/m²     Physical Exam   Constitutional: She is oriented to person, place, and time. She appears well-developed.   HENT:   Head: Normocephalic and atraumatic.   Eyes: Conjunctivae are normal. No scleral icterus.   Neck: No thyromegaly present.   Cardiovascular: Normal rate, regular rhythm and normal heart sounds.   Pulmonary/Chest: Effort normal and breath sounds normal. Right breast exhibits no inverted nipple, no mass, no nipple discharge, no skin change and no tenderness. Left breast exhibits no inverted nipple, no mass, no nipple discharge, no skin change and no tenderness.       Abdominal: Soft. Normal appearance and bowel sounds are normal. She exhibits no distension and no mass. There is no abdominal tenderness. There is no rebound and no guarding. No hernia.   Genitourinary:    Rectum normal.      Pelvic exam was performed with patient supine.   There is no rash, tenderness, lesion or injury on the right labia. There is no rash, tenderness, lesion or injury on the left labia. Uterus is not deviated, not enlarged, not fixed and not tender. Cervix exhibits no motion tenderness, no discharge and no friability. Right adnexum displays no mass, no tenderness and no fullness. Left adnexum displays no mass, no tenderness and no fullness.    No vaginal discharge, erythema, tenderness or bleeding.   No erythema, tenderness or bleeding in the vagina.    No foreign body in the vagina.      No signs of injury in the vagina.      " Genitourinary Comments: Grade 1 cystocele noted  Mild to mod atrophy noted       Neurological: She is alert and oriented to person, place, and time.   Skin: Skin is warm and dry.   Psychiatric: Her behavior is normal. Mood, judgment and thought content normal.   Nursing note and vitals reviewed.         Assessment/Plan    1) GYN HM: normal pap/HPV 7/2024 SBE demonstrated and encouraged.  2) STD screening: declines Condoms encouraged.  3) Bone health - Weight bearing exercise, dietary calcium recommendations and vitamin D reviewed.   4) Diet and Exercise discussed  5) Smoking Status: non smoker  6) Social: no issues  7)MMG:  UTD 5/2025 B1, schedule MMG 5/2026  8) DEXA- UTD  6/2024- osteopenia LROF 14 & 1.2 %, repeat DEXA in 2-3 years  9)C scope- UTD 4/ 2016, repeat 10 years per op note, refer LG for 4/2026  10)Parts of this document have been copied or forwarded from her previous visits and have been reviewed, updated and edited as indicated.   11) Follow up prn and 1 year annual            Diagnoses and all orders for this visit:    1. Routine gynecological examination (Primary)  -     POC Urinalysis Dipstick    2. Encounter for screening mammogram for malignant neoplasm of breast  -     Mammo Screening Digital Tomosynthesis Bilateral With CAD; Future    3. Screening for colon cancer  -     Ambulatory Referral For Screening Colonoscopy        Bety Irizarry MD  7/24/2025  11:04 EDT            Physical Exam  Vitals and nursing note reviewed. Exam conducted with a chaperone present.   Constitutional:       Appearance: Normal appearance. She is well-developed and normal weight.   HENT:      Head: Normocephalic and atraumatic.   Eyes:      General: No scleral icterus.     Conjunctiva/sclera: Conjunctivae normal.   Neck:      Thyroid: No thyromegaly.   Cardiovascular:      Rate and Rhythm: Normal rate and regular rhythm.      Heart sounds: Normal heart sounds.   Pulmonary:      Effort: Pulmonary effort is  normal.      Breath sounds: Normal breath sounds.   Chest:   Breasts:     Right: No inverted nipple, mass, nipple discharge, skin change or tenderness.      Left: No inverted nipple, mass, nipple discharge, skin change or tenderness.       Abdominal:      General: Bowel sounds are normal. There is no distension.      Palpations: Abdomen is soft. There is no mass.      Tenderness: There is no abdominal tenderness. There is no guarding or rebound.      Hernia: No hernia is present.   Genitourinary:     Exam position: Supine.      Labia:         Right: No rash, tenderness, lesion or injury.         Left: No rash, tenderness, lesion or injury.       Urethra: No prolapse, urethral pain, urethral swelling or urethral lesion.      Vagina: No signs of injury and foreign body. Prolapsed vaginal walls present. No vaginal discharge, erythema, tenderness or bleeding.      Cervix: No cervical motion tenderness, discharge or friability.      Uterus: Not deviated, not enlarged, not fixed and not tender.       Adnexa:         Right: No mass, tenderness or fullness.          Left: No mass, tenderness or fullness.        Rectum: Normal.      Comments: Grade 1 cystocele noted  Mild to mod atrophy noted    Musculoskeletal:      Cervical back: Neck supple.   Skin:     General: Skin is warm and dry.   Neurological:      Mental Status: She is alert and oriented to person, place, and time.   Psychiatric:         Mood and Affect: Mood normal.         Behavior: Behavior normal.         Thought Content: Thought content normal.         Judgment: Judgment normal.

## (undated) DEVICE — PK NEURO SPINE 40

## (undated) DEVICE — ENCORE® LATEX ORTHO SIZE 8, STERILE LATEX POWDER-FREE SURGICAL GLOVE: Brand: ENCORE

## (undated) DEVICE — MATCH HEAD

## (undated) DEVICE — SMOKE EVACUATION TUBING WITH 7/8 IN TO 1/4 IN REDUCER: Brand: BUFFALO FILTER

## (undated) DEVICE — DISPOSABLE BIPOLAR CABLE 12FT. (3.6M): Brand: KIRWAN

## (undated) DEVICE — GLV SURG BIOGEL LTX PF 7

## (undated) DEVICE — ANTIBACTERIAL UNDYED BRAIDED (POLYGLACTIN 910), SYNTHETIC ABSORBABLE SUTURE: Brand: COATED VICRYL

## (undated) DEVICE — MEDI-VAC YANKAUER SUCTION HANDLE W/BULBOUS TIP: Brand: CARDINAL HEALTH

## (undated) DEVICE — SYR LUERLOK 20CC

## (undated) DEVICE — ADHS SKIN DERMABOND TOP ADVANCED

## (undated) DEVICE — SUT VIC 1 OS8 27IN J699H

## (undated) DEVICE — SPONGE,LAP,12"X12",XR,ST,5/PK,40PK/CS: Brand: MEDLINE

## (undated) DEVICE — DIFFUSER: Brand: CORE, MAESTRO

## (undated) DEVICE — NDL SPINE 18G 31/2IN PNK

## (undated) DEVICE — APPL DURAPREP IODOPHOR APL 26ML

## (undated) DEVICE — GOWN,ECLIPSE,FABRIC-REINFORCED,X-LARGE: Brand: MEDLINE

## (undated) DEVICE — ELECTRD BLD EXT EDGE 1P COAT 6.5IN STRL

## (undated) DEVICE — GLV SURG BIOGEL LTX PF 7 1/2

## (undated) DEVICE — CONN TBG Y 5 IN 1 LF STRL

## (undated) DEVICE — SUT MNCRYL PLS ANTIB UD 4/0 PS2 18IN

## (undated) DEVICE — OIL CARTRIDGE: Brand: CORE, MAESTRO

## (undated) DEVICE — DRSNG WND GZ PAD BORDERED 4X8IN STRL

## (undated) DEVICE — OCCLUSIVE GAUZE STRIP,3% BISMUTH TRIBROMOPHENATE IN PETROLATUM BLEND: Brand: XEROFORM